# Patient Record
Sex: FEMALE | Race: BLACK OR AFRICAN AMERICAN | NOT HISPANIC OR LATINO | Employment: UNEMPLOYED | URBAN - METROPOLITAN AREA
[De-identification: names, ages, dates, MRNs, and addresses within clinical notes are randomized per-mention and may not be internally consistent; named-entity substitution may affect disease eponyms.]

---

## 2017-09-21 ENCOUNTER — HOSPITAL ENCOUNTER (EMERGENCY)
Facility: HOSPITAL | Age: 47
Discharge: HOME | End: 2017-09-21
Payer: COMMERCIAL

## 2017-09-21 VITALS
OXYGEN SATURATION: 98 % | TEMPERATURE: 99 F | HEART RATE: 93 BPM | SYSTOLIC BLOOD PRESSURE: 141 MMHG | WEIGHT: 272 LBS | RESPIRATION RATE: 18 BRPM | BODY MASS INDEX: 46.44 KG/M2 | DIASTOLIC BLOOD PRESSURE: 77 MMHG | HEIGHT: 64 IN

## 2017-09-21 DIAGNOSIS — N39.0 URINARY TRACT INFECTION WITH HEMATURIA, SITE UNSPECIFIED: Primary | ICD-10-CM

## 2017-09-21 DIAGNOSIS — R31.9 URINARY TRACT INFECTION WITH HEMATURIA, SITE UNSPECIFIED: Primary | ICD-10-CM

## 2017-09-21 LAB
B-HCG UR QL: NEGATIVE
BACTERIA #/AREA URNS AUTO: ABNORMAL /HPF
BILIRUB UR QL STRIP: NEGATIVE
CLARITY UR REFRACT.AUTO: ABNORMAL
COLOR UR AUTO: YELLOW
GLUCOSE UR QL STRIP: NEGATIVE
HGB UR QL STRIP: ABNORMAL
HYALINE CASTS UR QL AUTO: 0 /LPF
KETONES UR QL STRIP: NEGATIVE
LEUKOCYTE ESTERASE UR QL STRIP: ABNORMAL
MICROSCOPIC COMMENT: ABNORMAL
NITRITE UR QL STRIP: POSITIVE
PH UR STRIP: 5 [PH] (ref 5–8)
PROT UR QL STRIP: ABNORMAL
RBC #/AREA URNS AUTO: 4 /HPF (ref 0–4)
SP GR UR STRIP: 1.01 (ref 1–1.03)
URN SPEC COLLECT METH UR: ABNORMAL
UROBILINOGEN UR STRIP-ACNC: NEGATIVE EU/DL
WBC #/AREA URNS AUTO: >100 /HPF (ref 0–5)
WBC CLUMPS UR QL AUTO: ABNORMAL

## 2017-09-21 PROCEDURE — 99283 EMERGENCY DEPT VISIT LOW MDM: CPT

## 2017-09-21 PROCEDURE — 87077 CULTURE AEROBIC IDENTIFY: CPT

## 2017-09-21 PROCEDURE — 87186 SC STD MICRODIL/AGAR DIL: CPT

## 2017-09-21 PROCEDURE — 87088 URINE BACTERIA CULTURE: CPT

## 2017-09-21 PROCEDURE — 81000 URINALYSIS NONAUTO W/SCOPE: CPT

## 2017-09-21 PROCEDURE — 81025 URINE PREGNANCY TEST: CPT

## 2017-09-21 PROCEDURE — 87086 URINE CULTURE/COLONY COUNT: CPT

## 2017-09-21 RX ORDER — INSULIN GLARGINE 100 [IU]/ML
INJECTION, SOLUTION SUBCUTANEOUS NIGHTLY
COMMUNITY

## 2017-09-21 RX ORDER — SULFAMETHOXAZOLE AND TRIMETHOPRIM 800; 160 MG/1; MG/1
1 TABLET ORAL 2 TIMES DAILY
Qty: 14 TABLET | Refills: 0 | Status: SHIPPED | OUTPATIENT
Start: 2017-09-21 | End: 2017-09-28

## 2017-09-22 NOTE — ED PROVIDER NOTES
Encounter Date: 2017       History     Chief Complaint   Patient presents with    Urinary Tract Infection     Pt states she feels like she has a UTI. Pt states she's been having frequent urination and vaginal itching.     47-year-old female presents to the emergency room with urinary tract infection symptoms for approximately 2 days.  Patient has not taken anything for symptoms.  Denies any fever.  Reports pain and pressure urination.  Reports occasional vaginal itching.  Denies any vaginal discharge.  Reports strong odor and dark urine.  Reports suprapubic pain.          Review of patient's allergies indicates:   Allergen Reactions    Codeine      Past Medical History:   Diagnosis Date    Blood clot associated with vein wall inflammation     Diabetes mellitus     High cholesterol     Hypertension     Stroke      Past Surgical History:   Procedure Laterality Date    APPENDECTOMY      CARDIAC CATHETERIZATION      CARDIAC DEFIBRILLATOR PLACEMENT       SECTION       History reviewed. No pertinent family history.  Social History   Substance Use Topics    Smoking status: Never Smoker    Smokeless tobacco: Never Used    Alcohol use No     Review of Systems   Constitutional: Negative for fever.   HENT: Negative for sore throat.    Respiratory: Negative for shortness of breath.    Cardiovascular: Negative for chest pain.   Gastrointestinal: Negative for nausea.   Genitourinary: Positive for difficulty urinating, dysuria, frequency and urgency. Negative for flank pain, vaginal bleeding, vaginal discharge and vaginal pain.   Musculoskeletal: Negative for back pain.   Skin: Negative for rash.   Neurological: Negative for weakness.   Hematological: Does not bruise/bleed easily.   All other systems reviewed and are negative.      Physical Exam     Initial Vitals [174]   BP Pulse Resp Temp SpO2   (!) 141/77 93 18 98.6 °F (37 °C) 98 %      MAP       98.33         Physical Exam    Nursing note  and vitals reviewed.  Constitutional: She appears well-developed and well-nourished. No distress.   HENT:   Head: Normocephalic.   Eyes: Conjunctivae are normal.   Neck: Normal range of motion. Neck supple.   Cardiovascular: Normal rate.   Pulmonary/Chest: Breath sounds normal. No respiratory distress.   Abdominal: Soft. Bowel sounds are normal. She exhibits no distension and no abdominal bruit. There is tenderness in the suprapubic area. There is no rebound and no guarding. No hernia.   Neurological: She is alert and oriented to person, place, and time.   Skin: Skin is warm and dry.   Psychiatric: She has a normal mood and affect. Her behavior is normal. Judgment and thought content normal.         ED Course   Procedures  Labs Reviewed   URINALYSIS - Abnormal; Notable for the following:        Result Value    Appearance, UA Cloudy (*)     Protein, UA 1+ (*)     Occult Blood UA 3+ (*)     Nitrite, UA Positive (*)     Leukocytes, UA 3+ (*)     All other components within normal limits   URINALYSIS MICROSCOPIC - Abnormal; Notable for the following:     WBC, UA >100 (*)     WBC Clumps, UA Few (*)     Bacteria, UA Many (*)     All other components within normal limits   PREGNANCY TEST, URINE RAPID             Medical Decision Making:   Initial Assessment:   47-year-old female presents to the emergency room with urinary tract infection symptoms for approximately 2 days.  Patient has not taken anything for symptoms.  Denies any fever.  Reports pain and pressure urination.  Reports occasional vaginal itching.  Denies any vaginal discharge.  Reports strong odor and dark urine.  Reports suprapubic pain.  Abdomen is obese soft.  Minimal suprapubic tenderness.  Vital signs stable.  Differential Diagnosis:   UTI, vaginitis, cystitis  Clinical Tests:   Lab Tests: Ordered and Reviewed  ED Management:  Urine is positive for urinary tract infection.  I will prescribe an antibiotic.  I will also send the urine for culture.   Instructed patient to hydrate well.  Take Tylenol or Motrin as needed for fever.  If any symptoms worsen return to emergency department.                     ED Course      Clinical Impression:   The encounter diagnosis was Urinary tract infection with hematuria, site unspecified.    Disposition:   Disposition: Discharged  Condition: Stable                        Rosalba Auguste NP  09/21/17 7052

## 2017-09-25 LAB — BACTERIA UR CULT: NORMAL

## 2018-03-27 ENCOUNTER — LAB VISIT (OUTPATIENT)
Dept: LAB | Facility: HOSPITAL | Age: 48
End: 2018-03-27
Attending: INTERNAL MEDICINE
Payer: COMMERCIAL

## 2018-03-27 DIAGNOSIS — Z86.73 PERSONAL HISTORY OF TIA (TRANSIENT ISCHEMIC ATTACK): Primary | ICD-10-CM

## 2018-03-27 LAB
INR PPP: 3
PROTHROMBIN TIME: 31.8 SEC

## 2018-03-27 PROCEDURE — 85610 PROTHROMBIN TIME: CPT | Mod: PO

## 2018-03-27 PROCEDURE — 36415 COLL VENOUS BLD VENIPUNCTURE: CPT | Mod: PO

## 2018-04-12 ENCOUNTER — LAB VISIT (OUTPATIENT)
Dept: LAB | Facility: HOSPITAL | Age: 48
End: 2018-04-12
Attending: INTERNAL MEDICINE
Payer: COMMERCIAL

## 2018-04-12 DIAGNOSIS — Z79.01 LONG TERM (CURRENT) USE OF ANTICOAGULANTS: Primary | ICD-10-CM

## 2018-04-12 LAB
INR PPP: 4.7
PROTHROMBIN TIME: 50.2 SEC

## 2018-04-12 PROCEDURE — 36415 COLL VENOUS BLD VENIPUNCTURE: CPT | Mod: PO

## 2018-04-12 PROCEDURE — 85610 PROTHROMBIN TIME: CPT | Mod: PO

## 2018-04-17 ENCOUNTER — LAB VISIT (OUTPATIENT)
Dept: LAB | Facility: HOSPITAL | Age: 48
End: 2018-04-17
Attending: INTERNAL MEDICINE
Payer: COMMERCIAL

## 2018-04-17 DIAGNOSIS — Z79.01 LONG TERM (CURRENT) USE OF ANTICOAGULANTS: Primary | ICD-10-CM

## 2018-04-17 LAB
INR PPP: 2.4
PROTHROMBIN TIME: 25.6 SEC

## 2018-04-17 PROCEDURE — 85610 PROTHROMBIN TIME: CPT | Mod: PO

## 2018-04-17 PROCEDURE — 36415 COLL VENOUS BLD VENIPUNCTURE: CPT | Mod: PO

## 2018-04-30 ENCOUNTER — LAB VISIT (OUTPATIENT)
Dept: LAB | Facility: HOSPITAL | Age: 48
End: 2018-04-30
Attending: INTERNAL MEDICINE
Payer: COMMERCIAL

## 2018-04-30 DIAGNOSIS — Z79.01 LONG TERM (CURRENT) USE OF ANTICOAGULANTS: Primary | ICD-10-CM

## 2018-04-30 LAB
INR PPP: 3.9
PROTHROMBIN TIME: 42 SEC

## 2018-04-30 PROCEDURE — 85610 PROTHROMBIN TIME: CPT | Mod: PO

## 2018-04-30 PROCEDURE — 36415 COLL VENOUS BLD VENIPUNCTURE: CPT | Mod: PO

## 2018-05-09 ENCOUNTER — LAB VISIT (OUTPATIENT)
Dept: LAB | Facility: HOSPITAL | Age: 48
End: 2018-05-09
Attending: INTERNAL MEDICINE
Payer: COMMERCIAL

## 2018-05-09 DIAGNOSIS — Z79.01 LONG TERM (CURRENT) USE OF ANTICOAGULANTS: Primary | ICD-10-CM

## 2018-05-09 LAB
INR PPP: 2
PROTHROMBIN TIME: 22.1 SEC

## 2018-05-09 PROCEDURE — 85610 PROTHROMBIN TIME: CPT | Mod: PO

## 2018-05-09 PROCEDURE — 36415 COLL VENOUS BLD VENIPUNCTURE: CPT | Mod: PO

## 2018-06-13 ENCOUNTER — LAB VISIT (OUTPATIENT)
Dept: LAB | Facility: HOSPITAL | Age: 48
End: 2018-06-13
Attending: INTERNAL MEDICINE
Payer: COMMERCIAL

## 2018-06-13 DIAGNOSIS — Z79.01 LONG TERM (CURRENT) USE OF ANTICOAGULANTS: Primary | ICD-10-CM

## 2018-06-13 LAB
INR PPP: 3.5
PROTHROMBIN TIME: 37.3 SEC

## 2018-06-13 PROCEDURE — 36415 COLL VENOUS BLD VENIPUNCTURE: CPT | Mod: PO

## 2018-06-13 PROCEDURE — 85610 PROTHROMBIN TIME: CPT | Mod: PO

## 2018-07-26 ENCOUNTER — LAB VISIT (OUTPATIENT)
Dept: LAB | Facility: HOSPITAL | Age: 48
End: 2018-07-26
Attending: INTERNAL MEDICINE
Payer: COMMERCIAL

## 2018-07-26 DIAGNOSIS — Z86.73 PERSONAL HISTORY OF TIA (TRANSIENT ISCHEMIC ATTACK): Primary | ICD-10-CM

## 2018-07-26 DIAGNOSIS — I42.9 CARDIOMYOPATHY: ICD-10-CM

## 2018-07-26 DIAGNOSIS — Z79.01 LONG TERM (CURRENT) USE OF ANTICOAGULANTS: ICD-10-CM

## 2018-07-26 LAB
INR PPP: 2.9
PROTHROMBIN TIME: 30.8 SEC

## 2018-07-26 PROCEDURE — 36415 COLL VENOUS BLD VENIPUNCTURE: CPT | Mod: PO

## 2018-07-26 PROCEDURE — 85610 PROTHROMBIN TIME: CPT | Mod: PO

## 2018-09-05 ENCOUNTER — LAB VISIT (OUTPATIENT)
Dept: LAB | Facility: HOSPITAL | Age: 48
End: 2018-09-05
Attending: INTERNAL MEDICINE
Payer: COMMERCIAL

## 2018-09-05 DIAGNOSIS — Z86.73 PERSONAL HISTORY OF TIA (TRANSIENT ISCHEMIC ATTACK): ICD-10-CM

## 2018-09-05 DIAGNOSIS — I42.9 CARDIOMYOPATHY: Primary | ICD-10-CM

## 2018-09-05 DIAGNOSIS — Z79.01 LONG TERM CURRENT USE OF ANTICOAGULANT THERAPY: ICD-10-CM

## 2018-09-05 LAB
INR PPP: 3.4
PROTHROMBIN TIME: 36.7 SEC

## 2018-09-05 PROCEDURE — 85610 PROTHROMBIN TIME: CPT | Mod: PO

## 2018-09-05 PROCEDURE — 36415 COLL VENOUS BLD VENIPUNCTURE: CPT | Mod: PO

## 2018-09-24 ENCOUNTER — LAB VISIT (OUTPATIENT)
Dept: LAB | Facility: HOSPITAL | Age: 48
End: 2018-09-24
Attending: INTERNAL MEDICINE
Payer: COMMERCIAL

## 2018-09-24 DIAGNOSIS — Z79.01 LONG TERM (CURRENT) USE OF ANTICOAGULANTS: Primary | ICD-10-CM

## 2018-09-24 LAB
INR PPP: 2.1
PROTHROMBIN TIME: 23 SEC

## 2018-09-24 PROCEDURE — 36415 COLL VENOUS BLD VENIPUNCTURE: CPT | Mod: PO

## 2018-09-24 PROCEDURE — 85610 PROTHROMBIN TIME: CPT | Mod: PO

## 2018-10-23 ENCOUNTER — LAB VISIT (OUTPATIENT)
Dept: LAB | Facility: HOSPITAL | Age: 48
End: 2018-10-23
Attending: INTERNAL MEDICINE
Payer: COMMERCIAL

## 2018-10-23 DIAGNOSIS — Z86.73 PERSONAL HISTORY OF TIA (TRANSIENT ISCHEMIC ATTACK): Primary | ICD-10-CM

## 2018-10-23 LAB
INR PPP: 2.4
PROTHROMBIN TIME: 25.3 SEC

## 2018-10-23 PROCEDURE — 36415 COLL VENOUS BLD VENIPUNCTURE: CPT | Mod: PO

## 2018-10-23 PROCEDURE — 85610 PROTHROMBIN TIME: CPT | Mod: PO

## 2018-11-20 ENCOUNTER — LAB VISIT (OUTPATIENT)
Dept: LAB | Facility: HOSPITAL | Age: 48
End: 2018-11-20
Attending: INTERNAL MEDICINE
Payer: COMMERCIAL

## 2018-11-20 DIAGNOSIS — Z79.01 LONG TERM (CURRENT) USE OF ANTICOAGULANTS: Primary | ICD-10-CM

## 2018-11-20 LAB
INR PPP: 1.6
PROTHROMBIN TIME: 16.9 SEC

## 2018-11-20 PROCEDURE — 85610 PROTHROMBIN TIME: CPT | Mod: PO

## 2018-11-20 PROCEDURE — 36415 COLL VENOUS BLD VENIPUNCTURE: CPT | Mod: PO

## 2018-11-27 ENCOUNTER — LAB VISIT (OUTPATIENT)
Dept: LAB | Facility: HOSPITAL | Age: 48
End: 2018-11-27
Attending: INTERNAL MEDICINE
Payer: COMMERCIAL

## 2018-11-27 DIAGNOSIS — Z79.01 LONG TERM (CURRENT) USE OF ANTICOAGULANTS: Primary | ICD-10-CM

## 2018-11-27 LAB
INR PPP: 1.5
INR PPP: 1.5
PROTHROMBIN TIME: 16.6 SEC
PROTHROMBIN TIME: 16.6 SEC

## 2018-11-27 PROCEDURE — 85610 PROTHROMBIN TIME: CPT | Mod: PO

## 2018-11-27 PROCEDURE — 36415 COLL VENOUS BLD VENIPUNCTURE: CPT | Mod: PO

## 2018-12-07 ENCOUNTER — LAB VISIT (OUTPATIENT)
Dept: LAB | Facility: HOSPITAL | Age: 48
End: 2018-12-07
Attending: INTERNAL MEDICINE
Payer: COMMERCIAL

## 2018-12-07 DIAGNOSIS — Z79.01 LONG TERM (CURRENT) USE OF ANTICOAGULANTS: Primary | ICD-10-CM

## 2018-12-07 LAB
INR PPP: 1.9
PROTHROMBIN TIME: 20.3 SEC

## 2018-12-07 PROCEDURE — 36415 COLL VENOUS BLD VENIPUNCTURE: CPT | Mod: PO

## 2018-12-07 PROCEDURE — 85610 PROTHROMBIN TIME: CPT | Mod: PO

## 2019-01-22 ENCOUNTER — LAB VISIT (OUTPATIENT)
Dept: LAB | Facility: HOSPITAL | Age: 49
End: 2019-01-22
Attending: INTERNAL MEDICINE
Payer: COMMERCIAL

## 2019-01-22 DIAGNOSIS — Z79.01 LONG TERM (CURRENT) USE OF ANTICOAGULANTS: Primary | ICD-10-CM

## 2019-01-22 LAB
INR PPP: 2.9
PROTHROMBIN TIME: 30.6 SEC

## 2019-01-22 PROCEDURE — 85610 PROTHROMBIN TIME: CPT | Mod: PO,ER

## 2019-01-22 PROCEDURE — 36415 COLL VENOUS BLD VENIPUNCTURE: CPT | Mod: PO,ER

## 2019-02-26 ENCOUNTER — LAB VISIT (OUTPATIENT)
Dept: LAB | Facility: HOSPITAL | Age: 49
End: 2019-02-26
Attending: INTERNAL MEDICINE
Payer: COMMERCIAL

## 2019-02-26 DIAGNOSIS — Z79.01 LONG TERM (CURRENT) USE OF ANTICOAGULANTS: Primary | ICD-10-CM

## 2019-02-26 LAB
INR PPP: 4
PROTHROMBIN TIME: 43.1 SEC

## 2019-02-26 PROCEDURE — 85610 PROTHROMBIN TIME: CPT | Mod: PO

## 2019-02-26 PROCEDURE — 36415 COLL VENOUS BLD VENIPUNCTURE: CPT | Mod: PO

## 2019-03-07 ENCOUNTER — LAB VISIT (OUTPATIENT)
Dept: LAB | Facility: HOSPITAL | Age: 49
End: 2019-03-07
Attending: INTERNAL MEDICINE
Payer: COMMERCIAL

## 2019-03-07 DIAGNOSIS — Z79.01 LONG TERM (CURRENT) USE OF ANTICOAGULANTS: Primary | ICD-10-CM

## 2019-03-07 LAB
INR PPP: 1.4
PROTHROMBIN TIME: 15.6 SEC

## 2019-03-07 PROCEDURE — 85610 PROTHROMBIN TIME: CPT | Mod: PO

## 2019-03-07 PROCEDURE — 36415 COLL VENOUS BLD VENIPUNCTURE: CPT | Mod: PO

## 2019-03-26 ENCOUNTER — LAB VISIT (OUTPATIENT)
Dept: LAB | Facility: HOSPITAL | Age: 49
End: 2019-03-26
Attending: INTERNAL MEDICINE
Payer: COMMERCIAL

## 2019-03-26 DIAGNOSIS — Z79.01 LONG TERM (CURRENT) USE OF ANTICOAGULANTS: Primary | ICD-10-CM

## 2019-03-26 LAB
INR PPP: 1.8 (ref 0.8–1.2)
PROTHROMBIN TIME: 19.1 SEC (ref 9–12.5)

## 2019-03-26 PROCEDURE — 85610 PROTHROMBIN TIME: CPT | Mod: PO

## 2019-03-26 PROCEDURE — 36415 COLL VENOUS BLD VENIPUNCTURE: CPT | Mod: PO

## 2019-04-04 ENCOUNTER — HOSPITAL ENCOUNTER (EMERGENCY)
Facility: HOSPITAL | Age: 49
Discharge: HOME | End: 2019-04-04
Attending: SURGERY
Payer: COMMERCIAL

## 2019-04-04 VITALS
OXYGEN SATURATION: 100 % | WEIGHT: 293 LBS | SYSTOLIC BLOOD PRESSURE: 124 MMHG | BODY MASS INDEX: 51.49 KG/M2 | TEMPERATURE: 98 F | HEART RATE: 74 BPM | RESPIRATION RATE: 14 BRPM | DIASTOLIC BLOOD PRESSURE: 80 MMHG

## 2019-04-04 DIAGNOSIS — R07.9 CHEST PAIN: ICD-10-CM

## 2019-04-04 DIAGNOSIS — I42.8 NONISCHEMIC CARDIOMYOPATHY: ICD-10-CM

## 2019-04-04 DIAGNOSIS — I50.41 ACUTE COMBINED SYSTOLIC AND DIASTOLIC CONGESTIVE HEART FAILURE: Primary | ICD-10-CM

## 2019-04-04 LAB
ALBUMIN SERPL BCP-MCNC: 3.9 G/DL (ref 3.5–5.2)
ALP SERPL-CCNC: 62 U/L (ref 38–126)
ALT SERPL W/O P-5'-P-CCNC: 20 U/L (ref 10–44)
ANION GAP SERPL CALC-SCNC: 7 MMOL/L (ref 8–16)
AST SERPL-CCNC: 20 U/L (ref 15–46)
B-HCG UR QL: NEGATIVE
BASOPHILS # BLD AUTO: 0.02 K/UL (ref 0–0.2)
BASOPHILS NFR BLD: 0.3 % (ref 0–1.9)
BILIRUB SERPL-MCNC: 0.2 MG/DL (ref 0.1–1)
BILIRUB UR QL STRIP: NEGATIVE
BUN SERPL-MCNC: 19 MG/DL (ref 7–17)
CALCIUM SERPL-MCNC: 9.2 MG/DL (ref 8.7–10.5)
CHLORIDE SERPL-SCNC: 108 MMOL/L (ref 95–110)
CLARITY UR REFRACT.AUTO: CLEAR
CO2 SERPL-SCNC: 24 MMOL/L (ref 23–29)
COLOR UR AUTO: YELLOW
CREAT SERPL-MCNC: 1.09 MG/DL (ref 0.5–1.4)
D DIMER PPP FEU-MCNC: <200 NG/ML
DIFFERENTIAL METHOD: ABNORMAL
EOSINOPHIL # BLD AUTO: 0.2 K/UL (ref 0–0.5)
EOSINOPHIL NFR BLD: 3 % (ref 0–8)
ERYTHROCYTE [DISTWIDTH] IN BLOOD BY AUTOMATED COUNT: 14.5 % (ref 11.5–14.5)
EST. GFR  (AFRICAN AMERICAN): >60 ML/MIN/1.73 M^2
EST. GFR  (NON AFRICAN AMERICAN): 59.8 ML/MIN/1.73 M^2
GLUCOSE SERPL-MCNC: 170 MG/DL (ref 70–110)
GLUCOSE UR QL STRIP: NEGATIVE
HCT VFR BLD AUTO: 33.6 % (ref 37–48.5)
HGB BLD-MCNC: 10.5 G/DL (ref 12–16)
HGB UR QL STRIP: ABNORMAL
INR PPP: 2.7 (ref 0.8–1.2)
KETONES UR QL STRIP: NEGATIVE
LEUKOCYTE ESTERASE UR QL STRIP: ABNORMAL
LYMPHOCYTES # BLD AUTO: 2.5 K/UL (ref 1–4.8)
LYMPHOCYTES NFR BLD: 31.7 % (ref 18–48)
MCH RBC QN AUTO: 27.5 PG (ref 27–31)
MCHC RBC AUTO-ENTMCNC: 31.3 G/DL (ref 32–36)
MCV RBC AUTO: 88 FL (ref 82–98)
MICROSCOPIC COMMENT: NORMAL
MONOCYTES # BLD AUTO: 0.7 K/UL (ref 0.3–1)
MONOCYTES NFR BLD: 9.3 % (ref 4–15)
NEUTROPHILS # BLD AUTO: 4.4 K/UL (ref 1.8–7.7)
NEUTROPHILS NFR BLD: 55.2 % (ref 38–73)
NITRITE UR QL STRIP: NEGATIVE
NT-PROBNP: 484 PG/ML (ref 5–450)
PH UR STRIP: 5 [PH] (ref 5–8)
PLATELET # BLD AUTO: 253 K/UL (ref 150–350)
PMV BLD AUTO: 10.2 FL (ref 9.2–12.9)
POTASSIUM SERPL-SCNC: 4.7 MMOL/L (ref 3.5–5.1)
PROT SERPL-MCNC: 7.1 G/DL (ref 6–8.4)
PROT UR QL STRIP: NEGATIVE
PROTHROMBIN TIME: 28.5 SEC (ref 9–12.5)
RBC # BLD AUTO: 3.82 M/UL (ref 4–5.4)
RBC #/AREA URNS AUTO: 4 /HPF (ref 0–4)
SODIUM SERPL-SCNC: 139 MMOL/L (ref 136–145)
SP GR UR STRIP: 1.01 (ref 1–1.03)
TROPONIN I SERPL DL<=0.01 NG/ML-MCNC: <0.012 NG/ML (ref 0.01–0.03)
TROPONIN I SERPL DL<=0.01 NG/ML-MCNC: <0.012 NG/ML (ref 0.01–0.03)
URN SPEC COLLECT METH UR: ABNORMAL
UROBILINOGEN UR STRIP-ACNC: NEGATIVE EU/DL
WBC # BLD AUTO: 7.99 K/UL (ref 3.9–12.7)
WBC #/AREA URNS AUTO: 2 /HPF (ref 0–5)

## 2019-04-04 PROCEDURE — 93010 EKG 12-LEAD: ICD-10-PCS | Mod: ,,, | Performed by: STUDENT IN AN ORGANIZED HEALTH CARE EDUCATION/TRAINING PROGRAM

## 2019-04-04 PROCEDURE — 96361 HYDRATE IV INFUSION ADD-ON: CPT | Mod: ER

## 2019-04-04 PROCEDURE — 80053 COMPREHEN METABOLIC PANEL: CPT | Mod: ER

## 2019-04-04 PROCEDURE — 93005 ELECTROCARDIOGRAM TRACING: CPT | Mod: ER

## 2019-04-04 PROCEDURE — 81025 URINE PREGNANCY TEST: CPT | Mod: ER

## 2019-04-04 PROCEDURE — 85379 FIBRIN DEGRADATION QUANT: CPT | Mod: ER

## 2019-04-04 PROCEDURE — 25000003 PHARM REV CODE 250: Mod: ER | Performed by: SURGERY

## 2019-04-04 PROCEDURE — 84484 ASSAY OF TROPONIN QUANT: CPT | Mod: 91,ER

## 2019-04-04 PROCEDURE — 63600175 PHARM REV CODE 636 W HCPCS: Mod: ER | Performed by: SURGERY

## 2019-04-04 PROCEDURE — 81000 URINALYSIS NONAUTO W/SCOPE: CPT | Mod: ER

## 2019-04-04 PROCEDURE — 83880 ASSAY OF NATRIURETIC PEPTIDE: CPT | Mod: ER

## 2019-04-04 PROCEDURE — 85610 PROTHROMBIN TIME: CPT | Mod: ER

## 2019-04-04 PROCEDURE — 99283 EMERGENCY DEPT VISIT LOW MDM: CPT | Mod: 25,ER

## 2019-04-04 PROCEDURE — 96375 TX/PRO/DX INJ NEW DRUG ADDON: CPT | Mod: ER

## 2019-04-04 PROCEDURE — 93010 ELECTROCARDIOGRAM REPORT: CPT | Mod: ,,, | Performed by: STUDENT IN AN ORGANIZED HEALTH CARE EDUCATION/TRAINING PROGRAM

## 2019-04-04 PROCEDURE — 96374 THER/PROPH/DIAG INJ IV PUSH: CPT | Mod: ER

## 2019-04-04 PROCEDURE — 85025 COMPLETE CBC W/AUTO DIFF WBC: CPT | Mod: ER

## 2019-04-04 RX ORDER — SODIUM CHLORIDE 9 MG/ML
1000 INJECTION, SOLUTION INTRAVENOUS
Status: COMPLETED | OUTPATIENT
Start: 2019-04-04 | End: 2019-04-04

## 2019-04-04 RX ORDER — ASPIRIN 325 MG
325 TABLET ORAL
Status: COMPLETED | OUTPATIENT
Start: 2019-04-04 | End: 2019-04-04

## 2019-04-04 RX ORDER — FUROSEMIDE 10 MG/ML
20 INJECTION INTRAMUSCULAR; INTRAVENOUS
Status: COMPLETED | OUTPATIENT
Start: 2019-04-04 | End: 2019-04-04

## 2019-04-04 RX ORDER — LORAZEPAM 0.5 MG/1
0.5 TABLET ORAL
Status: COMPLETED | OUTPATIENT
Start: 2019-04-04 | End: 2019-04-04

## 2019-04-04 RX ORDER — ONDANSETRON 2 MG/ML
4 INJECTION INTRAMUSCULAR; INTRAVENOUS
Status: COMPLETED | OUTPATIENT
Start: 2019-04-04 | End: 2019-04-04

## 2019-04-04 RX ORDER — LORAZEPAM 1 MG/1
1 TABLET ORAL 2 TIMES DAILY
Qty: 10 TABLET | Refills: 0 | Status: ON HOLD | OUTPATIENT
Start: 2019-04-04 | End: 2023-02-10 | Stop reason: HOSPADM

## 2019-04-04 RX ADMIN — ONDANSETRON 4 MG: 2 INJECTION INTRAMUSCULAR; INTRAVENOUS at 08:04

## 2019-04-04 RX ADMIN — LIDOCAINE HYDROCHLORIDE: 20 SOLUTION ORAL; TOPICAL at 08:04

## 2019-04-04 RX ADMIN — FUROSEMIDE 20 MG: 10 INJECTION, SOLUTION INTRAMUSCULAR; INTRAVENOUS at 09:04

## 2019-04-04 RX ADMIN — SODIUM CHLORIDE 1000 ML: 0.9 INJECTION, SOLUTION INTRAVENOUS at 08:04

## 2019-04-04 RX ADMIN — ASPIRIN 325 MG ORAL TABLET 325 MG: 325 PILL ORAL at 08:04

## 2019-04-04 RX ADMIN — LORAZEPAM 0.5 MG: 0.5 TABLET ORAL at 09:04

## 2019-04-04 NOTE — ED PROVIDER NOTES
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  She has had associated shortness of breath.  She feels better now but still has some residual pain and shortness of breath. She has a history of nonischemic cardiomyopathy hypertension diabetes and DVT she is on anticoagulation    The history is provided by the patient.   Chest Pain   The current episode started 1 to 2 hours ago. Duration of episode(s) is 90 minutes. Chest pain occurs intermittently. The chest pain is improving. The pain is associated with breathing and coughing. At its most intense, the chest pain is at 8/10. The chest pain is currently at 7/10. The quality of the pain is described as aching. The pain does not radiate. Chest pain is worsened by deep breathing. Primary symptoms include shortness of breath. Pertinent negatives for primary symptoms include no cough, no wheezing, no palpitations, no abdominal pain and no dizziness.     Review of patient's allergies indicates:   Allergen Reactions    Codeine      Past Medical History:   Diagnosis Date    Blood clot associated with vein wall inflammation     Diabetes mellitus     High cholesterol     Hypertension     Stroke      Past Surgical History:   Procedure Laterality Date    APPENDECTOMY      CARDIAC CATHETERIZATION      CARDIAC DEFIBRILLATOR PLACEMENT       SECTION       History reviewed. No pertinent family history.  Social History     Tobacco Use    Smoking status: Never Smoker    Smokeless tobacco: Never Used   Substance Use Topics    Alcohol use: No    Drug use: No     Review of Systems   Constitutional: Negative.    HENT: Negative.    Eyes: Negative.    Respiratory: Positive for shortness of  breath. Negative for cough and wheezing.    Cardiovascular: Positive for chest pain. Negative for palpitations.   Gastrointestinal: Negative for abdominal pain.   Endocrine: Negative.    Genitourinary: Negative.    Musculoskeletal: Negative.    Skin: Negative.    Allergic/Immunologic: Negative.    Neurological: Negative.  Negative for dizziness.   Hematological: Negative.    Psychiatric/Behavioral: Negative.        Physical Exam     Initial Vitals [04/04/19 0749]   BP Pulse Resp Temp SpO2   125/73 76 15 98.5 °F (36.9 °C) 100 %      MAP       --         Physical Exam    Nursing note and vitals reviewed.  Constitutional: She appears well-developed and well-nourished.   Cardiovascular: Normal rate, regular rhythm, normal heart sounds and intact distal pulses.         ED Course   Procedures  Labs Reviewed   CBC W/ AUTO DIFFERENTIAL - Abnormal; Notable for the following components:       Result Value    RBC 3.82 (*)     Hemoglobin 10.5 (*)     Hematocrit 33.6 (*)     MCHC 31.3 (*)     All other components within normal limits   COMPREHENSIVE METABOLIC PANEL - Abnormal; Notable for the following components:    Glucose 170 (*)     BUN, Bld 19 (*)     Anion Gap 7 (*)     eGFR if non  59.8 (*)     All other components within normal limits   PROTIME-INR - Abnormal; Notable for the following components:    Prothrombin Time 28.5 (*)     INR 2.7 (*)     All other components within normal limits   URINALYSIS, REFLEX TO URINE CULTURE - Abnormal; Notable for the following components:    Occult Blood UA 1+ (*)     Leukocytes, UA 1+ (*)     All other components within normal limits    Narrative:     Preferred Collection Type->Urine, Clean Catch   NT-PRO NATRIURETIC PEPTIDE - Abnormal; Notable for the following components:    NT-proBNP 484 (*)     All other components within normal limits   TROPONIN I   PREGNANCY TEST, URINE RAPID   D DIMER   TROPONIN I   URINALYSIS MICROSCOPIC    Narrative:     Preferred Collection  Type->Urine, Clean Catch     EKG Readings: (Independently Interpreted)   Initial Reading: No STEMI. Rhythm: Normal Sinus Rhythm. Clinical Impression: Non-specific ST Depression     ECG Results          EKG 12-lead (In process)  Result time 04/04/19 11:12:08    In process by Interface, Lab In [facility] (04/04/19 11:12:08)                 Narrative:    Test Reason : R07.9,    Vent. Rate : 083 BPM     Atrial Rate : 083 BPM     P-R Int : 134 ms          QRS Dur : 080 ms      QT Int : 398 ms       P-R-T Axes : 038 038 -05 degrees     QTc Int : 467 ms    Normal sinus rhythm  Low voltage QRS  Nonspecific ST and T wave abnormality  Abnormal ECG  When compared with ECG of 17-JUL-2015 15:38,  Nonspecific T wave abnormality, improved in Lateral leads    Referred By: [email or web address]   SELF           Confirmed By:                             Imaging Results          X-Ray Chest AP Portable (Final result)  Result time 04/04/19 08:20:53    Final result by Michael Chiu MD (04/04/19 08:20:53)                 Impression:      Enlargement of the cardiac silhouette could reflect chamber enlargement or pericardial effusion or both.      Electronically signed by: Michael Chiu MD  Date:    04/04/2019  Time:    08:20             Narrative:    EXAMINATION:  XR CHEST AP PORTABLE    CLINICAL HISTORY:  chest pain;    FINDINGS:  Single view of the chest.  Left-sided pacing device noted.    Cardiac silhouette is enlarged.  The lungs demonstrate no evidence of active disease.  No evidence of pleural effusion or pneumothorax.  Bones appear intact.  Evaluation is limited by body habitus.                                 Medical Decision Making:   Initial Assessment:   Atypical chest pain with history of cardiomyopathy and mild congestive heart failure  ED Management:  Patient has mild CHF and no acute EKG changes and both troponins were negative. Chest x-ray is clear At the time of discharge she is chest pain-free and asymptomatic  she needs told to  follow up with her cardiologist at [facility] next week                      Clinical Impression:       ICD-10-CM ICD-9-CM   1. Acute combined systolic and diastolic congestive heart failure I50.41 428.41     428.0   2. Chest pain R07.9 786.50   3. Nonischemic cardiomyopathy I42.8 425.4         Disposition:   Disposition: Discharged  Condition: Stable                        KENYETTA Zamora III, MD  04/04/19 6088

## 2019-04-04 NOTE — ED NOTES
Wheeled to exam room with complaint of chest pain and SOB that started about 1hr pta. Denies any nausea, vomiting, or dizziness. Also reports feeling weak. Pt is morbidly obese. BBS CTA. No fever. Pain is non radiating.

## 2019-04-04 NOTE — ED NOTES
Pt unable to walk from lobby. PCT Katty went to get wheel chair when calling patient back to room. Pt moving very slowly and unable to get up. Pt sdtated she is sob.

## 2019-06-26 ENCOUNTER — LAB VISIT (OUTPATIENT)
Dept: LAB | Facility: HOSPITAL | Age: 49
End: 2019-06-26
Attending: INTERNAL MEDICINE
Payer: COMMERCIAL

## 2019-06-26 DIAGNOSIS — Z79.01 LONG TERM (CURRENT) USE OF ANTICOAGULANTS: Primary | ICD-10-CM

## 2019-06-26 LAB
INR PPP: 3.6 (ref 0.8–1.2)
PROTHROMBIN TIME: 38.1 SEC (ref 9–12.5)

## 2019-06-26 PROCEDURE — 85610 PROTHROMBIN TIME: CPT | Mod: PO

## 2019-06-26 PROCEDURE — 36415 COLL VENOUS BLD VENIPUNCTURE: CPT | Mod: PO

## 2019-07-09 ENCOUNTER — LAB VISIT (OUTPATIENT)
Dept: LAB | Facility: HOSPITAL | Age: 49
End: 2019-07-09
Attending: INTERNAL MEDICINE
Payer: COMMERCIAL

## 2019-07-09 DIAGNOSIS — Z79.01 LONG TERM (CURRENT) USE OF ANTICOAGULANTS: Primary | ICD-10-CM

## 2019-07-09 LAB
INR PPP: 3.2 (ref 0.8–1.2)
PROTHROMBIN TIME: 34.7 SEC (ref 9–12.5)

## 2019-07-09 PROCEDURE — 85610 PROTHROMBIN TIME: CPT | Mod: PO

## 2019-07-09 PROCEDURE — 36415 COLL VENOUS BLD VENIPUNCTURE: CPT | Mod: PO

## 2019-08-09 ENCOUNTER — LAB VISIT (OUTPATIENT)
Dept: LAB | Facility: HOSPITAL | Age: 49
End: 2019-08-09
Attending: INTERNAL MEDICINE
Payer: COMMERCIAL

## 2019-08-09 DIAGNOSIS — Z79.01 LONG TERM (CURRENT) USE OF ANTICOAGULANTS: Primary | ICD-10-CM

## 2019-08-09 LAB
INR PPP: 3.1 (ref 0.8–1.2)
PROTHROMBIN TIME: 33.2 SEC (ref 9–12.5)

## 2019-08-09 PROCEDURE — 36415 COLL VENOUS BLD VENIPUNCTURE: CPT | Mod: PO

## 2019-08-09 PROCEDURE — 85610 PROTHROMBIN TIME: CPT | Mod: PO

## 2019-09-26 ENCOUNTER — LAB VISIT (OUTPATIENT)
Dept: LAB | Facility: HOSPITAL | Age: 49
End: 2019-09-26
Attending: INTERNAL MEDICINE
Payer: COMMERCIAL

## 2019-09-26 DIAGNOSIS — Z79.01 LONG TERM (CURRENT) USE OF ANTICOAGULANTS: Primary | ICD-10-CM

## 2019-09-26 LAB
INR PPP: 1.8 (ref 0.8–1.2)
PROTHROMBIN TIME: 19.5 SEC (ref 9–12.5)

## 2019-09-26 PROCEDURE — 85610 PROTHROMBIN TIME: CPT | Mod: PO

## 2019-09-26 PROCEDURE — 36415 COLL VENOUS BLD VENIPUNCTURE: CPT | Mod: PO

## 2019-11-11 ENCOUNTER — HOSPITAL ENCOUNTER (EMERGENCY)
Facility: HOSPITAL | Age: 49
Discharge: HOME | End: 2019-11-11
Attending: FAMILY MEDICINE
Payer: COMMERCIAL

## 2019-11-11 VITALS
SYSTOLIC BLOOD PRESSURE: 158 MMHG | DIASTOLIC BLOOD PRESSURE: 74 MMHG | OXYGEN SATURATION: 100 % | WEIGHT: 291 LBS | RESPIRATION RATE: 16 BRPM | HEIGHT: 65 IN | BODY MASS INDEX: 48.48 KG/M2 | TEMPERATURE: 98 F | HEART RATE: 68 BPM

## 2019-11-11 DIAGNOSIS — R42 DIZZINESS: Primary | ICD-10-CM

## 2019-11-11 DIAGNOSIS — N30.00 ACUTE CYSTITIS WITHOUT HEMATURIA: ICD-10-CM

## 2019-11-11 LAB
ALBUMIN SERPL BCP-MCNC: 3.9 G/DL (ref 3.5–5.2)
ALP SERPL-CCNC: 55 U/L (ref 38–126)
ALT SERPL W/O P-5'-P-CCNC: 18 U/L (ref 10–44)
ANION GAP SERPL CALC-SCNC: 8 MMOL/L (ref 8–16)
APTT BLDCRRT: 32.5 SEC (ref 21–32)
AST SERPL-CCNC: 28 U/L (ref 15–46)
BACTERIA #/AREA URNS AUTO: ABNORMAL /HPF
BASOPHILS # BLD AUTO: 0.02 K/UL (ref 0–0.2)
BASOPHILS NFR BLD: 0.3 % (ref 0–1.9)
BILIRUB SERPL-MCNC: 0.3 MG/DL (ref 0.1–1)
BILIRUB UR QL STRIP: NEGATIVE
BUN SERPL-MCNC: 31 MG/DL (ref 7–17)
CALCIUM SERPL-MCNC: 9.5 MG/DL (ref 8.7–10.5)
CHLORIDE SERPL-SCNC: 108 MMOL/L (ref 95–110)
CLARITY UR REFRACT.AUTO: CLEAR
CO2 SERPL-SCNC: 23 MMOL/L (ref 23–29)
COLOR UR AUTO: YELLOW
CREAT SERPL-MCNC: 1.19 MG/DL (ref 0.5–1.4)
DIFFERENTIAL METHOD: ABNORMAL
EOSINOPHIL # BLD AUTO: 0.3 K/UL (ref 0–0.5)
EOSINOPHIL NFR BLD: 3.6 % (ref 0–8)
ERYTHROCYTE [DISTWIDTH] IN BLOOD BY AUTOMATED COUNT: 14.9 % (ref 11.5–14.5)
EST. GFR  (AFRICAN AMERICAN): >60 ML/MIN/1.73 M^2
EST. GFR  (NON AFRICAN AMERICAN): 53.4 ML/MIN/1.73 M^2
GLUCOSE SERPL-MCNC: 163 MG/DL (ref 70–110)
GLUCOSE UR QL STRIP: NEGATIVE
HCT VFR BLD AUTO: 33.8 % (ref 37–48.5)
HGB BLD-MCNC: 10.3 G/DL (ref 12–16)
HGB UR QL STRIP: ABNORMAL
HYALINE CASTS UR QL AUTO: 0 /LPF
INR PPP: 2.1 (ref 0.8–1.2)
KETONES UR QL STRIP: NEGATIVE
LEUKOCYTE ESTERASE UR QL STRIP: ABNORMAL
LYMPHOCYTES # BLD AUTO: 2.4 K/UL (ref 1–4.8)
LYMPHOCYTES NFR BLD: 30.8 % (ref 18–48)
MCH RBC QN AUTO: 27.6 PG (ref 27–31)
MCHC RBC AUTO-ENTMCNC: 30.5 G/DL (ref 32–36)
MCV RBC AUTO: 91 FL (ref 82–98)
MICROSCOPIC COMMENT: ABNORMAL
MONOCYTES # BLD AUTO: 0.7 K/UL (ref 0.3–1)
MONOCYTES NFR BLD: 9.2 % (ref 4–15)
NEUTROPHILS # BLD AUTO: 4.3 K/UL (ref 1.8–7.7)
NEUTROPHILS NFR BLD: 56.1 % (ref 38–73)
NITRITE UR QL STRIP: NEGATIVE
NT-PROBNP: 148 PG/ML (ref 5–450)
PH UR STRIP: 6 [PH] (ref 5–8)
PLATELET # BLD AUTO: 248 K/UL (ref 150–350)
PMV BLD AUTO: 10.5 FL (ref 9.2–12.9)
POCT GLUCOSE: 158 MG/DL (ref 70–110)
POTASSIUM SERPL-SCNC: 4.8 MMOL/L (ref 3.5–5.1)
PROT SERPL-MCNC: 7.2 G/DL (ref 6–8.4)
PROT UR QL STRIP: ABNORMAL
PROTHROMBIN TIME: 22.9 SEC (ref 9–12.5)
RBC # BLD AUTO: 3.73 M/UL (ref 4–5.4)
RBC #/AREA URNS AUTO: 2 /HPF (ref 0–4)
SODIUM SERPL-SCNC: 139 MMOL/L (ref 136–145)
SP GR UR STRIP: 1.02 (ref 1–1.03)
TROPONIN I SERPL DL<=0.01 NG/ML-MCNC: 0.01 NG/ML (ref 0.01–0.03)
URN SPEC COLLECT METH UR: ABNORMAL
UROBILINOGEN UR STRIP-ACNC: NEGATIVE EU/DL
WBC # BLD AUTO: 7.79 K/UL (ref 3.9–12.7)
WBC #/AREA URNS AUTO: 0 /HPF (ref 0–5)

## 2019-11-11 PROCEDURE — 93005 ELECTROCARDIOGRAM TRACING: CPT | Mod: ER

## 2019-11-11 PROCEDURE — 94760 N-INVAS EAR/PLS OXIMETRY 1: CPT | Mod: ER

## 2019-11-11 PROCEDURE — 84484 ASSAY OF TROPONIN QUANT: CPT | Mod: ER

## 2019-11-11 PROCEDURE — 36000 PLACE NEEDLE IN VEIN: CPT | Mod: ER

## 2019-11-11 PROCEDURE — 85610 PROTHROMBIN TIME: CPT | Mod: ER

## 2019-11-11 PROCEDURE — 81000 URINALYSIS NONAUTO W/SCOPE: CPT | Mod: ER

## 2019-11-11 PROCEDURE — 25000003 PHARM REV CODE 250: Mod: ER | Performed by: FAMILY MEDICINE

## 2019-11-11 PROCEDURE — 93010 ELECTROCARDIOGRAM REPORT: CPT | Mod: ,,, | Performed by: INTERNAL MEDICINE

## 2019-11-11 PROCEDURE — 85730 THROMBOPLASTIN TIME PARTIAL: CPT | Mod: ER

## 2019-11-11 PROCEDURE — 99284 EMERGENCY DEPT VISIT MOD MDM: CPT | Mod: 25,ER

## 2019-11-11 PROCEDURE — 80053 COMPREHEN METABOLIC PANEL: CPT | Mod: ER

## 2019-11-11 PROCEDURE — 82962 GLUCOSE BLOOD TEST: CPT | Mod: ER

## 2019-11-11 PROCEDURE — 93010 EKG 12-LEAD: ICD-10-PCS | Mod: 76,,, | Performed by: INTERNAL MEDICINE

## 2019-11-11 PROCEDURE — 83880 ASSAY OF NATRIURETIC PEPTIDE: CPT | Mod: ER

## 2019-11-11 PROCEDURE — 85025 COMPLETE CBC W/AUTO DIFF WBC: CPT | Mod: ER

## 2019-11-11 RX ORDER — CEPHALEXIN 500 MG/1
500 CAPSULE ORAL
Status: COMPLETED | OUTPATIENT
Start: 2019-11-11 | End: 2019-11-11

## 2019-11-11 RX ORDER — CEPHALEXIN 500 MG/1
500 CAPSULE ORAL EVERY 8 HOURS
Qty: 21 CAPSULE | Refills: 0 | Status: SHIPPED | OUTPATIENT
Start: 2019-11-11 | End: 2019-11-18

## 2019-11-11 RX ORDER — GABAPENTIN 100 MG/1
100 CAPSULE ORAL 2 TIMES DAILY
COMMUNITY

## 2019-11-11 RX ADMIN — CEPHALEXIN 500 MG: 500 CAPSULE ORAL at 11:11

## 2019-11-11 NOTE — ED PROVIDER NOTES
"Encounter Date: 2019       History     Chief Complaint   Patient presents with    Dizziness     "I was driving at 6:40 this morning and then I got dizzy and the car was spining and it lasted 3 minutes and then I got nauseated and weak"      49-year-old female complains of dizziness while she was driving a car this morning around 6:30 a.m..  Dizziness lasted for 2-3 minutes.  Says she fells lightheadedness and spinning.  Moderate nausea but no vomiting. No visual changes.  No chest pain or shortness of breath. Generalized weakness but no focal weakness. She did not take any medication this morning.  She did not eat anything so far.  Her blood sugar is 153.    The history is provided by the patient.     Review of patient's allergies indicates:   Allergen Reactions    Codeine      Past Medical History:   Diagnosis Date    Blood clot associated with vein wall inflammation     Diabetes mellitus     High cholesterol     Hypertension     Stroke      Past Surgical History:   Procedure Laterality Date    APPENDECTOMY      CARDIAC CATHETERIZATION      CARDIAC DEFIBRILLATOR PLACEMENT       SECTION       No family history on file.  Social History     Tobacco Use    Smoking status: Never Smoker    Smokeless tobacco: Never Used   Substance Use Topics    Alcohol use: No    Drug use: No     Review of Systems   Constitutional: Negative for activity change, appetite change, chills and fever.   HENT: Negative for congestion, ear discharge, ear pain, rhinorrhea, sinus pressure, sinus pain, sore throat and trouble swallowing.    Eyes: Negative for photophobia, pain, discharge, redness, itching and visual disturbance.   Respiratory: Negative for cough, chest tightness, shortness of breath and wheezing.    Cardiovascular: Negative for chest pain, palpitations and leg swelling.   Gastrointestinal: Negative for abdominal distention, abdominal pain, constipation, diarrhea, nausea and vomiting.   Genitourinary: " Negative for dysuria, flank pain, frequency and hematuria.   Musculoskeletal: Negative for back pain, gait problem, neck pain and neck stiffness.   Skin: Negative for rash and wound.   Neurological: Positive for dizziness and light-headedness. Negative for tremors, seizures, syncope, speech difficulty, weakness, numbness and headaches.   Psychiatric/Behavioral: Negative for behavioral problems, confusion, hallucinations and sleep disturbance. The patient is not nervous/anxious.    All other systems reviewed and are negative.      Physical Exam     Initial Vitals [11/11/19 0746]   BP Pulse Resp Temp SpO2   (!) 152/70 77 (!) 22 97.6 °F (36.4 °C) 99 %      MAP       --         Physical Exam    Nursing note and vitals reviewed.  Constitutional: Vital signs are normal. She appears well-developed and well-nourished. She is active.   HENT:   Head: Normocephalic and atraumatic.   Right Ear: External ear normal.   Nose: Nose normal.   Mouth/Throat: Oropharynx is clear and moist.   Eyes: Conjunctivae, EOM and lids are normal. Pupils are equal, round, and reactive to light.   Neck: Trachea normal, normal range of motion and full passive range of motion without pain. Neck supple. Normal range of motion present. No neck rigidity.   Cardiovascular: Normal rate, regular rhythm, S1 normal, S2 normal, normal heart sounds, intact distal pulses and normal pulses. Exam reveals no friction rub.    No murmur heard.  Pulmonary/Chest: Breath sounds normal. No respiratory distress. She has no wheezes. She has no rhonchi. She has no rales. She exhibits no tenderness.   Abdominal: Soft. Normal appearance and bowel sounds are normal. She exhibits no distension. There is no tenderness.   Musculoskeletal: Normal range of motion. She exhibits no edema or tenderness.   Lymphadenopathy:     She has no cervical adenopathy.   Neurological: She is alert and oriented to person, place, and time. She has normal strength and normal reflexes. No cranial  nerve deficit or sensory deficit. GCS score is 15. GCS eye subscore is 4. GCS verbal subscore is 5. GCS motor subscore is 6.   Skin: Skin is warm and intact. Capillary refill takes less than 2 seconds. No abrasion, no bruising and no rash noted.   Psychiatric: She has a normal mood and affect. Her speech is normal and behavior is normal. Judgment and thought content normal. She is not actively hallucinating. Cognition and memory are normal. She is attentive.         ED Course   Procedures  Labs Reviewed   POCT GLUCOSE - Abnormal; Notable for the following components:       Result Value    POCT Glucose 158 (*)     All other components within normal limits   CBC W/ AUTO DIFFERENTIAL   COMPREHENSIVE METABOLIC PANEL   TROPONIN I   B-TYPE NATRIURETIC PEPTIDE     EKG Readings: (Independently Interpreted)   Initial Reading: No STEMI. Rhythm: Normal Sinus Rhythm. Heart Rate: 72. Ectopy: No Ectopy. Conduction: Normal. ST Segments: Normal ST Segments. T Waves: Normal. Q Waves: V1, V2 and V3. Clinical Impression: Normal Sinus Rhythm       Imaging Results    None          Medical Decision Making:   Initial Assessment:   Patient complains of dizziness while she was driving her car.  Feeling much better on arrival to ED.  No chest pain, shortness of breath.  Normal glucose.  Normal blood pressure.  No recent infections.  Differential Diagnosis:   Benign positional vertigo, dizziness, posterior infarct,  Hypertension, hypoglycemia, orthostatic hypertension.  Clinical Tests:   Lab Tests: Ordered and Reviewed  Medical Tests: Ordered and Reviewed  ED Management:  Patient dizziness has improved on arrival to ED.  Very minimal dizziness on standing up.  No drop in blood pressure with orthostatics.  No nystagmus.  No focal deficits.  Patient declined CT due to claustrophobic.  Re-examination of her neurological condition is stable.  Dizziness - benign positional.  Patient also has urinary tract infection treated with Keflex.  Patient  will be discharged home in stable condition follow-up ED with worsening symptoms or PCP.                                 Clinical Impression:       ICD-10-CM ICD-9-CM   1. Dizziness R42 780.4   2. Acute cystitis without hematuria N30.00 595.0         Disposition:   Disposition: Discharged  Condition: Stable                     William Muniz MD  11/14/19 0155

## 2019-12-17 ENCOUNTER — LAB VISIT (OUTPATIENT)
Dept: LAB | Facility: HOSPITAL | Age: 49
End: 2019-12-17
Attending: INTERNAL MEDICINE
Payer: COMMERCIAL

## 2019-12-17 DIAGNOSIS — Z79.01 LONG TERM (CURRENT) USE OF ANTICOAGULANTS: Primary | ICD-10-CM

## 2019-12-17 LAB
INR PPP: 2.6 (ref 0.8–1.2)
PROTHROMBIN TIME: 27.5 SEC (ref 9–12.5)

## 2019-12-17 PROCEDURE — 36415 COLL VENOUS BLD VENIPUNCTURE: CPT | Mod: PO

## 2019-12-17 PROCEDURE — 85610 PROTHROMBIN TIME: CPT | Mod: PO

## 2020-01-23 ENCOUNTER — LAB VISIT (OUTPATIENT)
Dept: LAB | Facility: HOSPITAL | Age: 50
End: 2020-01-23
Attending: INTERNAL MEDICINE
Payer: COMMERCIAL

## 2020-01-23 DIAGNOSIS — Z79.01 LONG TERM (CURRENT) USE OF ANTICOAGULANTS: Primary | ICD-10-CM

## 2020-01-23 LAB
INR PPP: 2.2 (ref 0.8–1.2)
PROTHROMBIN TIME: 24 SEC (ref 9–12.5)

## 2020-01-23 PROCEDURE — 85610 PROTHROMBIN TIME: CPT | Mod: PO

## 2020-01-23 PROCEDURE — 36415 COLL VENOUS BLD VENIPUNCTURE: CPT | Mod: PO

## 2020-03-03 ENCOUNTER — LAB VISIT (OUTPATIENT)
Dept: LAB | Facility: HOSPITAL | Age: 50
End: 2020-03-03
Attending: INTERNAL MEDICINE
Payer: COMMERCIAL

## 2020-03-03 DIAGNOSIS — Z79.01 LONG TERM (CURRENT) USE OF ANTICOAGULANTS: Primary | ICD-10-CM

## 2020-03-03 LAB
INR PPP: 2.9 (ref 0.8–1.2)
PROTHROMBIN TIME: 30.6 SEC (ref 9–12.5)

## 2020-03-03 PROCEDURE — 36415 COLL VENOUS BLD VENIPUNCTURE: CPT | Mod: PO

## 2020-03-03 PROCEDURE — 85610 PROTHROMBIN TIME: CPT | Mod: PO

## 2020-05-19 ENCOUNTER — LAB VISIT (OUTPATIENT)
Dept: LAB | Facility: HOSPITAL | Age: 50
End: 2020-05-19
Attending: INTERNAL MEDICINE
Payer: COMMERCIAL

## 2020-05-19 DIAGNOSIS — Z79.01 LONG TERM (CURRENT) USE OF ANTICOAGULANTS: Primary | ICD-10-CM

## 2020-05-19 LAB
INR PPP: 2 (ref 0.8–1.2)
PROTHROMBIN TIME: 23.5 SEC (ref 9–12.5)

## 2020-05-19 PROCEDURE — 36415 COLL VENOUS BLD VENIPUNCTURE: CPT | Mod: PO

## 2020-05-19 PROCEDURE — 85610 PROTHROMBIN TIME: CPT | Mod: PO

## 2020-06-19 ENCOUNTER — LAB VISIT (OUTPATIENT)
Dept: LAB | Facility: HOSPITAL | Age: 50
End: 2020-06-19
Attending: INTERNAL MEDICINE
Payer: COMMERCIAL

## 2020-06-19 DIAGNOSIS — Z79.01 LONG TERM (CURRENT) USE OF ANTICOAGULANTS: Primary | ICD-10-CM

## 2020-06-19 LAB
INR PPP: 1.5 (ref 0.8–1.2)
PROTHROMBIN TIME: 16.1 SEC (ref 9–12.5)

## 2020-06-19 PROCEDURE — 36415 COLL VENOUS BLD VENIPUNCTURE: CPT | Mod: PO

## 2020-06-19 PROCEDURE — 85610 PROTHROMBIN TIME: CPT | Mod: PO

## 2020-06-30 ENCOUNTER — LAB VISIT (OUTPATIENT)
Dept: LAB | Facility: HOSPITAL | Age: 50
End: 2020-06-30
Attending: INTERNAL MEDICINE
Payer: COMMERCIAL

## 2020-06-30 DIAGNOSIS — Z79.01 LONG TERM (CURRENT) USE OF ANTICOAGULANTS: Primary | ICD-10-CM

## 2020-06-30 LAB
INR PPP: 2.3 (ref 0.8–1.2)
PROTHROMBIN TIME: 24.3 SEC (ref 9–12.5)

## 2020-06-30 PROCEDURE — 85610 PROTHROMBIN TIME: CPT | Mod: PO

## 2020-06-30 PROCEDURE — 36415 COLL VENOUS BLD VENIPUNCTURE: CPT | Mod: PO

## 2020-08-05 ENCOUNTER — LAB VISIT (OUTPATIENT)
Dept: LAB | Facility: HOSPITAL | Age: 50
End: 2020-08-05
Attending: INTERNAL MEDICINE
Payer: COMMERCIAL

## 2020-08-05 DIAGNOSIS — Z79.01 LONG TERM (CURRENT) USE OF ANTICOAGULANTS: Primary | ICD-10-CM

## 2020-08-05 LAB
INR PPP: 2.3 (ref 0.8–1.2)
PROTHROMBIN TIME: 24 SEC (ref 9–12.5)

## 2020-08-05 PROCEDURE — 85610 PROTHROMBIN TIME: CPT | Mod: PO

## 2020-08-05 PROCEDURE — 36415 COLL VENOUS BLD VENIPUNCTURE: CPT | Mod: PO

## 2020-09-10 ENCOUNTER — LAB VISIT (OUTPATIENT)
Dept: LAB | Facility: HOSPITAL | Age: 50
End: 2020-09-10
Attending: INTERNAL MEDICINE
Payer: COMMERCIAL

## 2020-09-10 DIAGNOSIS — Z79.01 LONG TERM (CURRENT) USE OF ANTICOAGULANTS: Primary | ICD-10-CM

## 2020-09-10 LAB
INR PPP: 2.2 (ref 0.8–1.2)
PROTHROMBIN TIME: 23.7 SEC (ref 9–12.5)

## 2020-09-10 PROCEDURE — 36415 COLL VENOUS BLD VENIPUNCTURE: CPT | Mod: PO

## 2020-09-10 PROCEDURE — 85610 PROTHROMBIN TIME: CPT | Mod: PO

## 2021-12-16 ENCOUNTER — LAB VISIT (OUTPATIENT)
Dept: LAB | Facility: HOSPITAL | Age: 51
End: 2021-12-16
Attending: NURSE PRACTITIONER
Payer: MEDICAID

## 2021-12-16 DIAGNOSIS — Z79.01 LONG TERM (CURRENT) USE OF ANTICOAGULANTS: Primary | ICD-10-CM

## 2021-12-16 LAB
INR PPP: 2.6 (ref 0.8–1.2)
PROTHROMBIN TIME: 25.9 SEC (ref 9–12.5)

## 2021-12-16 PROCEDURE — 85610 PROTHROMBIN TIME: CPT | Mod: PO | Performed by: NURSE PRACTITIONER

## 2021-12-16 PROCEDURE — 36415 COLL VENOUS BLD VENIPUNCTURE: CPT | Mod: PO | Performed by: NURSE PRACTITIONER

## 2022-02-18 ENCOUNTER — LAB VISIT (OUTPATIENT)
Dept: LAB | Facility: HOSPITAL | Age: 52
End: 2022-02-18
Attending: NURSE PRACTITIONER
Payer: COMMERCIAL

## 2022-02-18 DIAGNOSIS — Z79.01 ANTICOAGULANT LONG-TERM USE: Primary | ICD-10-CM

## 2022-02-18 LAB
INR PPP: 2 (ref 0.8–1.2)
PROTHROMBIN TIME: 19.7 SEC (ref 9–12.5)

## 2022-02-18 PROCEDURE — 85610 PROTHROMBIN TIME: CPT | Mod: PO | Performed by: NURSE PRACTITIONER

## 2022-02-18 PROCEDURE — 36415 COLL VENOUS BLD VENIPUNCTURE: CPT | Mod: PO | Performed by: NURSE PRACTITIONER

## 2022-06-08 ENCOUNTER — LAB VISIT (OUTPATIENT)
Dept: LAB | Facility: HOSPITAL | Age: 52
End: 2022-06-08
Attending: NURSE PRACTITIONER
Payer: COMMERCIAL

## 2022-06-08 DIAGNOSIS — Z79.01 ANTICOAGULANT LONG-TERM USE: Primary | ICD-10-CM

## 2022-06-08 LAB
INR PPP: 1.6 (ref 0.8–1.2)
PROTHROMBIN TIME: 17.1 SEC (ref 9–12.5)

## 2022-06-08 PROCEDURE — 36415 COLL VENOUS BLD VENIPUNCTURE: CPT | Mod: PO | Performed by: NURSE PRACTITIONER

## 2022-06-08 PROCEDURE — 85610 PROTHROMBIN TIME: CPT | Mod: PO | Performed by: NURSE PRACTITIONER

## 2023-02-09 ENCOUNTER — HOSPITAL ENCOUNTER (INPATIENT)
Facility: HOSPITAL | Age: 53
LOS: 1 days | Discharge: HOME | DRG: 291 | End: 2023-02-10
Attending: EMERGENCY MEDICINE | Admitting: FAMILY MEDICINE
Payer: COMMERCIAL

## 2023-02-09 ENCOUNTER — TELEPHONE (OUTPATIENT)
Dept: CARDIOLOGY | Facility: CLINIC | Age: 53
End: 2023-02-09

## 2023-02-09 DIAGNOSIS — J96.01 ACUTE RESPIRATORY FAILURE WITH HYPOXIA: ICD-10-CM

## 2023-02-09 DIAGNOSIS — I50.31 ACUTE DIASTOLIC CHF (CONGESTIVE HEART FAILURE), NYHA CLASS 3: Primary | ICD-10-CM

## 2023-02-09 DIAGNOSIS — R06.02 SOB (SHORTNESS OF BREATH): ICD-10-CM

## 2023-02-09 DIAGNOSIS — I50.9 CHF EXACERBATION: ICD-10-CM

## 2023-02-09 PROBLEM — E11.40 DIABETIC NEUROPATHY: Status: ACTIVE | Noted: 2023-02-09

## 2023-02-09 PROBLEM — Z79.4 TYPE 2 DIABETES MELLITUS, WITH LONG-TERM CURRENT USE OF INSULIN: Status: ACTIVE | Noted: 2023-02-09

## 2023-02-09 PROBLEM — I10 HYPERTENSION: Status: ACTIVE | Noted: 2023-02-09

## 2023-02-09 PROBLEM — E11.9 TYPE 2 DIABETES MELLITUS, WITH LONG-TERM CURRENT USE OF INSULIN: Status: ACTIVE | Noted: 2023-02-09

## 2023-02-09 PROBLEM — I42.8 NONISCHEMIC CARDIOMYOPATHY: Status: ACTIVE | Noted: 2023-02-09

## 2023-02-09 PROBLEM — E78.5 HYPERLIPIDEMIA: Status: ACTIVE | Noted: 2023-02-09

## 2023-02-09 PROBLEM — E66.01 SEVERE OBESITY (BMI >= 40): Status: ACTIVE | Noted: 2023-02-09

## 2023-02-09 PROBLEM — S91.109A OPEN TOE WOUND: Status: ACTIVE | Noted: 2023-02-09

## 2023-02-09 PROBLEM — I73.9 PAD (PERIPHERAL ARTERY DISEASE): Status: ACTIVE | Noted: 2023-02-09

## 2023-02-09 PROBLEM — I67.2 CEREBRAL ARTERIOSCLEROSIS WITH HISTORY OF PREVIOUS STROKE: Status: ACTIVE | Noted: 2023-02-09

## 2023-02-09 PROBLEM — G47.33 OSA (OBSTRUCTIVE SLEEP APNEA): Status: ACTIVE | Noted: 2023-02-09

## 2023-02-09 PROBLEM — Z86.73 CEREBRAL ARTERIOSCLEROSIS WITH HISTORY OF PREVIOUS STROKE: Status: ACTIVE | Noted: 2023-02-09

## 2023-02-09 LAB
ALBUMIN SERPL BCP-MCNC: 4.3 G/DL (ref 3.5–5.2)
ALP SERPL-CCNC: 77 U/L (ref 38–126)
ALT SERPL W/O P-5'-P-CCNC: 28 U/L (ref 10–44)
ANION GAP SERPL CALC-SCNC: 7 MMOL/L (ref 8–16)
AORTIC ROOT ANNULUS: 3.05 CM
AORTIC VALVE CUSP SEPERATION: 2.02 CM
AST SERPL-CCNC: 31 U/L (ref 15–46)
AV INDEX (PROSTH): 0.72
AV MEAN GRADIENT: 2 MMHG
AV PEAK GRADIENT: 4 MMHG
AV VALVE AREA: 2.25 CM2
AV VELOCITY RATIO: 0.66
BASOPHILS # BLD AUTO: 0.06 K/UL (ref 0–0.2)
BASOPHILS NFR BLD: 0.7 % (ref 0–1.9)
BILIRUB SERPL-MCNC: 0.4 MG/DL (ref 0.1–1)
BNP SERPL-MCNC: 521 PG/ML (ref 0–99)
BSA FOR ECHO PROCEDURE: 2.49 M2
CALCIUM SERPL-MCNC: 9.1 MG/DL (ref 8.7–10.5)
CHLORIDE SERPL-SCNC: 109 MMOL/L (ref 95–110)
CO2 SERPL-SCNC: 24 MMOL/L (ref 23–29)
CREAT SERPL-MCNC: 1.3 MG/DL (ref 0.5–1.4)
CV ECHO LV RWT: 0.33 CM
DIFFERENTIAL METHOD: ABNORMAL
DOP CALC AO PEAK VEL: 0.98 M/S
DOP CALC AO VTI: 16.3 CM
DOP CALC LVOT AREA: 3.1 CM2
DOP CALC LVOT DIAMETER: 2 CM
DOP CALC LVOT PEAK VEL: 0.65 M/S
DOP CALC LVOT STROKE VOLUME: 36.74 CM3
DOP CALC MV VTI: 21.8 CM
DOP CALCLVOT PEAK VEL VTI: 11.7 CM
ECHO LV POSTERIOR WALL: 1 CM (ref 0.6–1.1)
EJECTION FRACTION: 25 %
EOSINOPHIL # BLD AUTO: 0.4 K/UL (ref 0–0.5)
EOSINOPHIL NFR BLD: 4.4 % (ref 0–8)
ERYTHROCYTE [DISTWIDTH] IN BLOOD BY AUTOMATED COUNT: 14.6 % (ref 11.5–14.5)
EST. GFR  (NO RACE VARIABLE): 49.2 ML/MIN/1.73 M^2
FRACTIONAL SHORTENING: 14 % (ref 28–44)
GLUCOSE SERPL-MCNC: 152 MG/DL (ref 70–110)
HCT VFR BLD AUTO: 37.4 % (ref 37–48.5)
HGB BLD-MCNC: 11.6 G/DL (ref 12–16)
IMM GRANULOCYTES # BLD AUTO: 0.05 K/UL (ref 0–0.04)
IMM GRANULOCYTES NFR BLD AUTO: 0.6 % (ref 0–0.5)
INR PPP: 2 (ref 0.8–1.2)
INTERVENTRICULAR SEPTUM: 1.04 CM (ref 0.6–1.1)
IVRT: 72.31 MSEC
LA MAJOR: 5.6 CM
LA MINOR: 5.3 CM
LA WIDTH: 4.9 CM
LEFT ATRIUM SIZE: 4.7 CM
LEFT ATRIUM VOLUME INDEX: 45.6 ML/M2
LEFT ATRIUM VOLUME: 106.61 CM3
LEFT INTERNAL DIMENSION IN SYSTOLE: 5.22 CM (ref 2.1–4)
LEFT VENTRICLE DIASTOLIC VOLUME INDEX: 79.95 ML/M2
LEFT VENTRICLE DIASTOLIC VOLUME: 187.08 ML
LEFT VENTRICLE MASS INDEX: 111 G/M2
LEFT VENTRICLE SYSTOLIC VOLUME INDEX: 55.8 ML/M2
LEFT VENTRICLE SYSTOLIC VOLUME: 130.59 ML
LEFT VENTRICULAR INTERNAL DIMENSION IN DIASTOLE: 6.1 CM (ref 3.5–6)
LEFT VENTRICULAR MASS: 260.49 G
LVOT MG: 0.81 MMHG
LVOT MV: 0.42 CM/S
LYMPHOCYTES # BLD AUTO: 2.3 K/UL (ref 1–4.8)
LYMPHOCYTES NFR BLD: 27.5 % (ref 18–48)
MAGNESIUM SERPL-MCNC: 1.5 MG/DL (ref 1.6–2.6)
MCH RBC QN AUTO: 28.2 PG (ref 27–31)
MCHC RBC AUTO-ENTMCNC: 31 G/DL (ref 32–36)
MCV RBC AUTO: 91 FL (ref 82–98)
MONOCYTES # BLD AUTO: 0.6 K/UL (ref 0.3–1)
MONOCYTES NFR BLD: 6.7 % (ref 4–15)
MV MEAN GRADIENT: 1 MMHG
MV PEAK GRADIENT: 4 MMHG
MV VALVE AREA BY CONTINUITY EQUATION: 1.69 CM2
NEUTROPHILS # BLD AUTO: 4.9 K/UL (ref 1.8–7.7)
NEUTROPHILS NFR BLD: 60.1 % (ref 38–73)
NRBC BLD-RTO: 0 /100 WBC
NT-PROBNP SERPL-MCNC: 1120 PG/ML (ref 5–900)
PISA MRMAX VEL: 3.48 M/S
PISA TR MAX VEL: 2.07 M/S
PLATELET # BLD AUTO: 277 K/UL (ref 150–450)
PMV BLD AUTO: 10.3 FL (ref 9.2–12.9)
POCT GLUCOSE: 177 MG/DL (ref 70–110)
POCT GLUCOSE: 196 MG/DL (ref 70–110)
POCT GLUCOSE: 209 MG/DL (ref 70–110)
POTASSIUM SERPL-SCNC: 4.3 MMOL/L (ref 3.5–5.1)
PROT SERPL-MCNC: 7.8 G/DL (ref 6–8.4)
PROTHROMBIN TIME: 20.9 SEC (ref 9–12.5)
PULM VEIN S/D RATIO: 0.78
PV MV: 0.41 M/S
PV PEAK D VEL: 0.37 M/S
PV PEAK S VEL: 0.29 M/S
PV PEAK VELOCITY: 0.58 CM/S
RA MAJOR: 5.3 CM
RA WIDTH: 4.1 CM
RBC # BLD AUTO: 4.12 M/UL (ref 4–5.4)
RIGHT VENTRICULAR END-DIASTOLIC DIMENSION: 2.9 CM
RIGHT VENTRICULAR LENGTH IN DIASTOLE (APICAL 4-CHAMBER VIEW): 6.1 CM
SODIUM SERPL-SCNC: 140 MMOL/L (ref 136–145)
TDI LATERAL: 0.05 M/S
TDI SEPTAL: 0.05 M/S
TDI: 0.05 M/S
TR MAX PG: 17 MMHG
TRICUSPID ANNULAR PLANE SYSTOLIC EXCURSION: 2 CM
TROPONIN I SERPL-MCNC: <0.012 NG/ML (ref 0.01–0.03)
UUN UR-MCNC: 20 MG/DL (ref 7–17)
WBC # BLD AUTO: 8.19 K/UL (ref 3.9–12.7)

## 2023-02-09 PROCEDURE — 99900035 HC TECH TIME PER 15 MIN (STAT)

## 2023-02-09 PROCEDURE — 25000003 PHARM REV CODE 250: Performed by: NURSE PRACTITIONER

## 2023-02-09 PROCEDURE — 63600175 PHARM REV CODE 636 W HCPCS: Performed by: FAMILY MEDICINE

## 2023-02-09 PROCEDURE — 99900035 HC TECH TIME PER 15 MIN (STAT): Mod: ER

## 2023-02-09 PROCEDURE — 94761 N-INVAS EAR/PLS OXIMETRY MLT: CPT

## 2023-02-09 PROCEDURE — 85610 PROTHROMBIN TIME: CPT | Mod: ER | Performed by: EMERGENCY MEDICINE

## 2023-02-09 PROCEDURE — 83880 ASSAY OF NATRIURETIC PEPTIDE: CPT | Mod: ER | Performed by: EMERGENCY MEDICINE

## 2023-02-09 PROCEDURE — 93010 EKG 12-LEAD: ICD-10-PCS | Mod: ,,, | Performed by: INTERNAL MEDICINE

## 2023-02-09 PROCEDURE — 63600175 PHARM REV CODE 636 W HCPCS: Mod: ER | Performed by: EMERGENCY MEDICINE

## 2023-02-09 PROCEDURE — 99223 PR INITIAL HOSPITAL CARE,LEVL III: ICD-10-PCS | Mod: ,,, | Performed by: NURSE PRACTITIONER

## 2023-02-09 PROCEDURE — 80053 COMPREHEN METABOLIC PANEL: CPT | Mod: ER | Performed by: EMERGENCY MEDICINE

## 2023-02-09 PROCEDURE — 83036 HEMOGLOBIN GLYCOSYLATED A1C: CPT | Performed by: FAMILY MEDICINE

## 2023-02-09 PROCEDURE — 36415 COLL VENOUS BLD VENIPUNCTURE: CPT | Performed by: FAMILY MEDICINE

## 2023-02-09 PROCEDURE — 83880 ASSAY OF NATRIURETIC PEPTIDE: CPT | Mod: 91 | Performed by: FAMILY MEDICINE

## 2023-02-09 PROCEDURE — 99223 1ST HOSP IP/OBS HIGH 75: CPT | Mod: ,,, | Performed by: NURSE PRACTITIONER

## 2023-02-09 PROCEDURE — 93010 ELECTROCARDIOGRAM REPORT: CPT | Mod: ,,, | Performed by: INTERNAL MEDICINE

## 2023-02-09 PROCEDURE — 99291 CRITICAL CARE FIRST HOUR: CPT | Mod: 25,ER

## 2023-02-09 PROCEDURE — 84484 ASSAY OF TROPONIN QUANT: CPT | Mod: ER | Performed by: EMERGENCY MEDICINE

## 2023-02-09 PROCEDURE — 94760 N-INVAS EAR/PLS OXIMETRY 1: CPT | Mod: ER

## 2023-02-09 PROCEDURE — 11000001 HC ACUTE MED/SURG PRIVATE ROOM

## 2023-02-09 PROCEDURE — 93005 ELECTROCARDIOGRAM TRACING: CPT | Mod: ER

## 2023-02-09 PROCEDURE — 96374 THER/PROPH/DIAG INJ IV PUSH: CPT | Mod: ER

## 2023-02-09 PROCEDURE — 25000003 PHARM REV CODE 250: Performed by: FAMILY MEDICINE

## 2023-02-09 PROCEDURE — 25500020 PHARM REV CODE 255: Performed by: INTERNAL MEDICINE

## 2023-02-09 PROCEDURE — 83735 ASSAY OF MAGNESIUM: CPT | Performed by: FAMILY MEDICINE

## 2023-02-09 PROCEDURE — 85025 COMPLETE CBC W/AUTO DIFF WBC: CPT | Mod: ER | Performed by: EMERGENCY MEDICINE

## 2023-02-09 PROCEDURE — 27000221 HC OXYGEN, UP TO 24 HOURS: Mod: ER

## 2023-02-09 RX ORDER — SEMAGLUTIDE 1.34 MG/ML
1 INJECTION, SOLUTION SUBCUTANEOUS
COMMUNITY
Start: 2023-01-11

## 2023-02-09 RX ORDER — CIPROFLOXACIN 500 MG/1
500 TABLET ORAL EVERY 12 HOURS
Status: DISCONTINUED | OUTPATIENT
Start: 2023-02-09 | End: 2023-02-10 | Stop reason: HOSPADM

## 2023-02-09 RX ORDER — IBUPROFEN 200 MG
24 TABLET ORAL
Status: DISCONTINUED | OUTPATIENT
Start: 2023-02-09 | End: 2023-02-10 | Stop reason: HOSPADM

## 2023-02-09 RX ORDER — SODIUM CHLORIDE 0.9 % (FLUSH) 0.9 %
10 SYRINGE (ML) INJECTION
Status: DISCONTINUED | OUTPATIENT
Start: 2023-02-09 | End: 2023-02-10 | Stop reason: HOSPADM

## 2023-02-09 RX ORDER — WARFARIN SODIUM 5 MG/1
5 TABLET ORAL
Status: DISCONTINUED | OUTPATIENT
Start: 2023-02-10 | End: 2023-02-09

## 2023-02-09 RX ORDER — METOPROLOL SUCCINATE 100 MG/1
1 TABLET, EXTENDED RELEASE ORAL 2 TIMES DAILY
Status: ON HOLD | COMMUNITY
Start: 2021-11-23 | End: 2023-02-10 | Stop reason: HOSPADM

## 2023-02-09 RX ORDER — METOPROLOL SUCCINATE 25 MG/1
25 TABLET, EXTENDED RELEASE ORAL DAILY
Status: DISCONTINUED | OUTPATIENT
Start: 2023-02-09 | End: 2023-02-10 | Stop reason: HOSPADM

## 2023-02-09 RX ORDER — GLUCAGON 1 MG
1 KIT INJECTION
Status: DISCONTINUED | OUTPATIENT
Start: 2023-02-09 | End: 2023-02-10 | Stop reason: HOSPADM

## 2023-02-09 RX ORDER — DULOXETIN HYDROCHLORIDE 60 MG/1
60 CAPSULE, DELAYED RELEASE ORAL 2 TIMES DAILY
COMMUNITY
Start: 2022-02-25

## 2023-02-09 RX ORDER — LACTULOSE 10 G/15ML
10 SOLUTION ORAL; RECTAL EVERY 4 HOURS PRN
COMMUNITY
Start: 2022-12-08

## 2023-02-09 RX ORDER — FUROSEMIDE 10 MG/ML
40 INJECTION INTRAMUSCULAR; INTRAVENOUS
Status: COMPLETED | OUTPATIENT
Start: 2023-02-09 | End: 2023-02-09

## 2023-02-09 RX ORDER — GLIMEPIRIDE 4 MG/1
8 TABLET ORAL
Status: ON HOLD | COMMUNITY
Start: 2022-02-01 | End: 2023-02-10 | Stop reason: HOSPADM

## 2023-02-09 RX ORDER — WARFARIN SODIUM 5 MG/1
5 TABLET ORAL DAILY
Status: DISCONTINUED | OUTPATIENT
Start: 2023-02-09 | End: 2023-02-10 | Stop reason: HOSPADM

## 2023-02-09 RX ORDER — IBUPROFEN 200 MG
16 TABLET ORAL
Status: DISCONTINUED | OUTPATIENT
Start: 2023-02-09 | End: 2023-02-10 | Stop reason: HOSPADM

## 2023-02-09 RX ORDER — WARFARIN SODIUM 5 MG/1
5 TABLET ORAL
Status: DISCONTINUED | OUTPATIENT
Start: 2023-02-11 | End: 2023-02-09

## 2023-02-09 RX ORDER — FUROSEMIDE 10 MG/ML
40 INJECTION INTRAMUSCULAR; INTRAVENOUS
Status: DISCONTINUED | OUTPATIENT
Start: 2023-02-09 | End: 2023-02-10

## 2023-02-09 RX ORDER — SPIRONOLACTONE 25 MG/1
25 TABLET ORAL DAILY
Status: DISCONTINUED | OUTPATIENT
Start: 2023-02-09 | End: 2023-02-10 | Stop reason: HOSPADM

## 2023-02-09 RX ORDER — SPIRONOLACTONE 25 MG/1
1 TABLET ORAL DAILY
COMMUNITY
Start: 2021-11-19

## 2023-02-09 RX ORDER — DULOXETIN HYDROCHLORIDE 30 MG/1
60 CAPSULE, DELAYED RELEASE ORAL 2 TIMES DAILY
Status: DISCONTINUED | OUTPATIENT
Start: 2023-02-09 | End: 2023-02-10 | Stop reason: HOSPADM

## 2023-02-09 RX ORDER — INSULIN ASPART 100 [IU]/ML
0-5 INJECTION, SOLUTION INTRAVENOUS; SUBCUTANEOUS
Status: DISCONTINUED | OUTPATIENT
Start: 2023-02-09 | End: 2023-02-10 | Stop reason: HOSPADM

## 2023-02-09 RX ORDER — ONDANSETRON 8 MG/1
8 TABLET, ORALLY DISINTEGRATING ORAL EVERY 6 HOURS PRN
Status: DISCONTINUED | OUTPATIENT
Start: 2023-02-09 | End: 2023-02-10 | Stop reason: HOSPADM

## 2023-02-09 RX ADMIN — SACUBITRIL AND VALSARTAN 1 TABLET: 97; 103 TABLET, FILM COATED ORAL at 09:02

## 2023-02-09 RX ADMIN — METOPROLOL SUCCINATE 25 MG: 25 TABLET, EXTENDED RELEASE ORAL at 02:02

## 2023-02-09 RX ADMIN — WARFARIN SODIUM 5 MG: 5 TABLET ORAL at 05:02

## 2023-02-09 RX ADMIN — INSULIN ASPART 1 UNITS: 100 INJECTION, SOLUTION INTRAVENOUS; SUBCUTANEOUS at 09:02

## 2023-02-09 RX ADMIN — ONDANSETRON 8 MG: 8 TABLET, ORALLY DISINTEGRATING ORAL at 06:02

## 2023-02-09 RX ADMIN — DULOXETINE 60 MG: 30 CAPSULE, DELAYED RELEASE ORAL at 09:02

## 2023-02-09 RX ADMIN — SPIRONOLACTONE 25 MG: 25 TABLET, FILM COATED ORAL at 02:02

## 2023-02-09 RX ADMIN — FUROSEMIDE 40 MG: 10 INJECTION, SOLUTION INTRAMUSCULAR; INTRAVENOUS at 01:02

## 2023-02-09 RX ADMIN — SACUBITRIL AND VALSARTAN 1 TABLET: 97; 103 TABLET, FILM COATED ORAL at 02:02

## 2023-02-09 RX ADMIN — CIPROFLOXACIN HYDROCHLORIDE 500 MG: 500 TABLET, FILM COATED ORAL at 05:02

## 2023-02-09 RX ADMIN — FUROSEMIDE 40 MG: 10 INJECTION, SOLUTION INTRAMUSCULAR; INTRAVENOUS at 02:02

## 2023-02-09 RX ADMIN — INSULIN DETEMIR 10 UNITS: 100 INJECTION, SOLUTION SUBCUTANEOUS at 09:02

## 2023-02-09 RX ADMIN — HUMAN ALBUMIN MICROSPHERES AND PERFLUTREN 0.11 MG: 10; .22 INJECTION, SOLUTION INTRAVENOUS at 12:02

## 2023-02-09 NOTE — ASSESSMENT & PLAN NOTE
Followed by vascular surgery and WOC at [facility] with plans for US and angiogram soon  Medical management for now with asa, Statin, ARB  Continue Cipro

## 2023-02-09 NOTE — SUBJECTIVE & OBJECTIVE
Past Medical History:   Diagnosis Date    Blood clot associated with vein wall inflammation     CHF (congestive heart failure)     Diabetes mellitus     High cholesterol     Hypertension     Mixed hyperlipidemia     NICM (nonischemic cardiomyopathy)     ROMEL (obstructive sleep apnea)     PAD (peripheral artery disease)     PAD (peripheral artery disease)     Stroke        Past Surgical History:   Procedure Laterality Date    APPENDECTOMY      CARDIAC CATHETERIZATION      CARDIAC DEFIBRILLATOR PLACEMENT       SECTION         Review of patient's allergies indicates:   Allergen Reactions    Codeine        No current facility-administered medications on file prior to encounter.     Current Outpatient Medications on File Prior to Encounter   Medication Sig    carvedilol (COREG) 25 MG tablet Take 25 mg by mouth 2 (two) times daily with meals. 1.5 pills twice daily    furosemide (LASIX) 80 MG tablet Take 80 mg by mouth once daily.    gabapentin (NEURONTIN) 100 MG capsule Take 100 mg by mouth 3 (three) times daily.    glipiZIDE (GLUCOTROL) 5 MG TR24 Take 5 mg by mouth after lunch.    insulin glargine (LANTUS) 100 unit/mL injection Inject into the skin every evening.    insulin lispro (HUMALOG) 100 unit/mL injection Inject into the skin 3 (three) times daily before meals.    lisinopril (PRINIVIL,ZESTRIL) 20 MG tablet Take 20 mg by mouth once daily.    LORazepam (ATIVAN) 1 MG tablet Take 1 tablet (1 mg total) by mouth 2 (two) times daily. As needed for muscle tightness    metformin (GLUCOPHAGE) 1000 MG tablet Take 1,000 mg by mouth every evening.    mometasone (NASONEX) 50 mcg/actuation nasal spray 2 sprays by Nasal route once daily.    rosuvastatin (CRESTOR) 20 MG tablet Take 40 mg by mouth once daily.    warfarin (COUMADIN) 5 MG tablet Take 5 mg by mouth once daily. Sun, Mon, Wed, Fri, Sat  On Tues and  i take 7.5 mg     Family History    None       Tobacco Use    Smoking status: Never    Smokeless tobacco:  Never   Substance and Sexual Activity    Alcohol use: No    Drug use: No    Sexual activity: Not on file     Review of Systems   Constitutional:  Positive for activity change, appetite change and fatigue. Negative for diaphoresis and fever.   HENT:  Negative for congestion.    Eyes:  Negative for photophobia and visual disturbance.   Respiratory:  Positive for cough and shortness of breath. Negative for chest tightness.    Cardiovascular:  Positive for leg swelling. Negative for palpitations.   Endocrine: Negative for cold intolerance and heat intolerance.   Genitourinary:  Negative for dysuria.   Musculoskeletal:  Negative for arthralgias and neck stiffness.   Skin:  Negative for color change and wound.   Neurological:  Positive for weakness.   Psychiatric/Behavioral:  Negative for agitation.    Objective:     Vital Signs (Most Recent):  Temp: 96.8 °F (36 °C) (02/09/23 0845)  Pulse: 95 (02/09/23 0845)  Resp: 18 (02/09/23 0845)  BP: 137/74 (02/09/23 0845)  SpO2: 100 % (02/09/23 0845) Vital Signs (24h Range):  Temp:  [96.8 °F (36 °C)-98.3 °F (36.8 °C)] 96.8 °F (36 °C)  Pulse:  [] 95  Resp:  [18-26] 18  SpO2:  [88 %-100 %] 100 %  BP: (137-179)/(65-90) 137/74     Weight: 135.2 kg (298 lb)  Body mass index is 49.59 kg/m².    Physical Exam  Constitutional:       Appearance: She is obese. She is not diaphoretic.   HENT:      Head: Normocephalic and atraumatic.      Nose: Nose normal.   Eyes:      Pupils: Pupils are equal, round, and reactive to light.   Cardiovascular:      Rate and Rhythm: Normal rate.   Pulmonary:      Effort: Pulmonary effort is normal.   Abdominal:      General: Bowel sounds are normal.      Palpations: Abdomen is soft.      Comments: obese   Musculoskeletal:      Cervical back: Normal range of motion.      Right lower leg: Edema present.      Left lower leg: Edema present.   Skin:     General: Skin is warm.      Comments: Left fifth digit dressing in place   Neurological:      General: No focal  deficit present.      Mental Status: She is alert and oriented to person, place, and time. Mental status is at baseline.   Psychiatric:         Mood and Affect: Mood normal.         Behavior: Behavior normal.         CRANIAL NERVES     CN III, IV, VI   Pupils are equal, round, and reactive to light.     Significant Labs: A1C: No results for input(s): HGBA1C in the last 4320 hours.  ABGs: No results for input(s): PH, PCO2, HCO3, POCSATURATED, BE, TOTALHB, COHB, METHB, O2HB, POCFIO2, PO2 in the last 48 hours.  Blood Culture: No results for input(s): LABBLOO in the last 48 hours.  CBC:   Recent Labs   Lab 02/09/23 0153   WBC 8.19   HGB 11.6*   HCT 37.4        CMP:   Recent Labs   Lab 02/09/23 0153      K 4.3      CO2 24   *   BUN 20*   CREATININE 1.30   CALCIUM 9.1   PROT 7.8   ALBUMIN 4.3   BILITOT 0.4   ALKPHOS 77   AST 31   ALT 28   ANIONGAP 7*     Lactic Acid: No results for input(s): LACTATE in the last 48 hours.  Lipase: No results for input(s): LIPASE in the last 48 hours.  Lipid Panel: No results for input(s): CHOL, HDL, LDLCALC, TRIG, CHOLHDL in the last 48 hours.  Magnesium: No results for input(s): MG in the last 48 hours.  Troponin:   Recent Labs   Lab 02/09/23 0153   TROPONINI <0.012     TSH: No results for input(s): TSH in the last 4320 hours.  Urine Culture: No results for input(s): LABURIN in the last 48 hours.  Urine Studies: No results for input(s): COLORU, APPEARANCEUA, PHUR, SPECGRAV, PROTEINUA, GLUCUA, KETONESU, BILIRUBINUA, OCCULTUA, NITRITE, UROBILINOGEN, LEUKOCYTESUR, RBCUA, WBCUA, BACTERIA, SQUAMEPITHEL, HYALINECASTS in the last 48 hours.    Invalid input(s): WRIGHTSUR    Significant Imaging: I have reviewed all pertinent imaging results/findings within the past 24 hours.

## 2023-02-09 NOTE — SUBJECTIVE & OBJECTIVE
Past Medical History:   Diagnosis Date    Blood clot associated with vein wall inflammation     Diabetes mellitus     High cholesterol     Hypertension     NICM (nonischemic cardiomyopathy)     PAD (peripheral artery disease)     Stroke        Past Surgical History:   Procedure Laterality Date    APPENDECTOMY      CARDIAC CATHETERIZATION      CARDIAC DEFIBRILLATOR PLACEMENT       SECTION         Review of patient's allergies indicates:   Allergen Reactions    Codeine        No current facility-administered medications on file prior to encounter.     Current Outpatient Medications on File Prior to Encounter   Medication Sig    carvedilol (COREG) 25 MG tablet Take 25 mg by mouth 2 (two) times daily with meals. 1.5 pills twice daily    furosemide (LASIX) 80 MG tablet Take 80 mg by mouth once daily.    gabapentin (NEURONTIN) 100 MG capsule Take 100 mg by mouth 3 (three) times daily.    glipiZIDE (GLUCOTROL) 5 MG TR24 Take 5 mg by mouth after lunch.    insulin glargine (LANTUS) 100 unit/mL injection Inject into the skin every evening.    insulin lispro (HUMALOG) 100 unit/mL injection Inject into the skin 3 (three) times daily before meals.    LORazepam (ATIVAN) 1 MG tablet Take 1 tablet (1 mg total) by mouth 2 (two) times daily. As needed for muscle tightness    metformin (GLUCOPHAGE) 1000 MG tablet Take 1,000 mg by mouth every evening.    mometasone (NASONEX) 50 mcg/actuation nasal spray 2 sprays by Nasal route once daily.    rosuvastatin (CRESTOR) 20 MG tablet Take 40 mg by mouth once daily.    warfarin (COUMADIN) 5 MG tablet Take 5 mg by mouth once daily. Sun, Mon, Wed, Fri, Sat  On Tues and  i take 7.5 mg    [DISCONTINUED] lisinopril (PRINIVIL,ZESTRIL) 20 MG tablet Take 20 mg by mouth once daily.     Family History    None       Tobacco Use    Smoking status: Never    Smokeless tobacco: Never   Substance and Sexual Activity    Alcohol use: No    Drug use: No    Sexual activity: Not on file     Review  of Systems   Constitutional: Negative. Negative for diaphoresis.   HENT: Negative.     Eyes: Negative.    Cardiovascular:  Positive for dyspnea on exertion and leg swelling. Negative for chest pain, near-syncope, orthopnea, palpitations, paroxysmal nocturnal dyspnea and syncope.   Respiratory:  Positive for shortness of breath.    Endocrine: Negative.    Hematologic/Lymphatic: Negative.    Skin:  Positive for poor wound healing.   Gastrointestinal:  Negative for abdominal pain, nausea and vomiting.   Genitourinary: Negative.    Neurological: Negative.    Psychiatric/Behavioral: Negative.     Objective:     Vital Signs (Most Recent):  Temp: 97.2 °F (36.2 °C) (02/09/23 1125)  Pulse: 97 (02/09/23 1125)  Resp: 18 (02/09/23 1125)  BP: 131/74 (02/09/23 1125)  SpO2: 97 % (02/09/23 1125) Vital Signs (24h Range):  Temp:  [96.8 °F (36 °C)-98.3 °F (36.8 °C)] 97.2 °F (36.2 °C)  Pulse:  [] 97  Resp:  [18-26] 18  SpO2:  [88 %-100 %] 97 %  BP: (131-179)/(65-90) 131/74     Weight: 135.2 kg (298 lb)  Body mass index is 49.59 kg/m².    SpO2: 97 %         Intake/Output Summary (Last 24 hours) at 2/9/2023 1220  Last data filed at 2/9/2023 0430  Gross per 24 hour   Intake --   Output 950 ml   Net -950 ml       Lines/Drains/Airways       Peripheral Intravenous Line  Duration                  Peripheral IV - Single Lumen 02/09/23 0147 20 G Right Antecubital <1 day                    Physical Exam  Constitutional:       General: She is not in acute distress.     Appearance: She is obese. She is not diaphoretic.   HENT:      Head: Atraumatic.   Eyes:      General:         Right eye: No discharge.         Left eye: No discharge.   Cardiovascular:      Rate and Rhythm: Normal rate and regular rhythm.   Pulmonary:      Breath sounds: Rales present.   Abdominal:      General: Bowel sounds are normal.      Palpations: Abdomen is soft.   Musculoskeletal:      Right lower leg: Edema present.      Left lower leg: Edema present.   Skin:      General: Skin is warm and dry.   Neurological:      Mental Status: She is alert and oriented to person, place, and time.   Psychiatric:         Mood and Affect: Mood normal.         Behavior: Behavior normal.         Thought Content: Thought content normal.         Judgment: Judgment normal.       Significant Labs: BMP:   Recent Labs   Lab 02/09/23 0153   *      K 4.3      CO2 24   BUN 20*   CREATININE 1.30   CALCIUM 9.1   , CMP   Recent Labs   Lab 02/09/23 0153      K 4.3      CO2 24   *   BUN 20*   CREATININE 1.30   CALCIUM 9.1   PROT 7.8   ALBUMIN 4.3   BILITOT 0.4   ALKPHOS 77   AST 31   ALT 28   ANIONGAP 7*   , CBC   Recent Labs   Lab 02/09/23 0153   WBC 8.19   HGB 11.6*   HCT 37.4      , INR   Recent Labs   Lab 02/09/23 0153   INR 2.0*   , Lipid Panel No results for input(s): CHOL, HDL, LDLCALC, TRIG, CHOLHDL in the last 48 hours., Troponin   Recent Labs   Lab 02/09/23 0153   TROPONINI <0.012   , and All pertinent lab results from the last 24 hours have been reviewed.    Significant Imaging: Echocardiogram: Transthoracic echo (TTE) complete (Cupid Only): No results found for this or any previous visit.

## 2023-02-09 NOTE — TREATMENT PLAN
[facility] RN met with patient and family in room regarding [facility] follow up upon discharge. Pt agreeable to hospital follow up .RN informed pt of scheduled appointment and that appointment will also appear on d/c AVS. Patient informed to bring all medication bottles to [facility] follow up appointment.  [facility] information handout, appointment letter and folder provided to patient. Pt able to drive self to appointment    Patient Contact info:Contact Information   [phone]    Person providing transportation contact info: pt drives    Barriers to attending [facility] visit: lives in [place], [place]    Future Appointments   Date Time Provider Department Center   2/22/2023  9:00 AM Poonam Ellis MD [facility] [place]

## 2023-02-09 NOTE — NURSING
Patient arrived to unit from [facility] ED. Vitals obtained and assessment performed. MD notified of patient's arrival to unit. VN also notified to complete admit assessment.    Patient/Caregiver provided printed discharge information.

## 2023-02-09 NOTE — ASSESSMENT & PLAN NOTE
Diabetic neuropathy  Patient's FSGs are uncontrolled due to hyperglycemia on current medication regimen.  Last A1c reviewed- No results found for: LABA1C, HGBA1C  Most recent fingerstick glucose reviewed- No results for input(s): POCTGLUCOSE in the last 24 hours.  Current correctional scale  Low  Maintain anti-hyperglycemic dose as follows-   Antihyperglycemics (From admission, onward)    None        Hold Oral hypoglycemics while patient is in the hospital.

## 2023-02-09 NOTE — HPI
Gama Oenil is a 52 year old female with NICM, obesity, PAD with left toe wound, DM, AICD, HLD, PAF, CVA . Patient is followed by  Cardiology, wound care, and vascular surgery. Patient presented to the ED with a 1 week history of worsening SOB. She reports compliance with medication and the only recent medication change was the addition of cipro for her toe wound. She believes that her last known LVEF was ~ 30%. Patient reports that she ate a turkey sandwich PTA which is out of the ordinary for her. She is usually compliant with her low sodium diet. Patient denies chest pain, palpitations, NV, worsening edema, diaphoresis. EKG ST without acute ischemic changes. Troponin negative. TTE is pending. INR 2. Patient diuresing with IV Lasix and is feeling better.

## 2023-02-09 NOTE — CONSULTS
Food & Nutrition  Education    Diet Education: Fluid Restricted/Low Sodium Diet      Nutrition Education Handouts Provided: Low Salt Diet, Fluid Restricted Diet      Comments: RD remote for coverage, Pt admitted for CHF exacerbation with PMH of NICM, obesity, PAD with left toe wound, DM, AICD, HLD, PAF, CVA . Endorses compliance with meds. Pt also reports that she ate a turkey sandwich PTA which is out of the ordinary for her. She is usually compliant with her low sodium diet. RD remote for coverage handouts attached to discharge documents, RD to follow.      Follow-Up: Yes    Please Re-consult as needed        Thanks!

## 2023-02-09 NOTE — ED NOTES
Pt bed soiled in urine d/t purewick failure. Pt was able to stand at bedside with assist d/t significant SOB with exertion. Bed cleaned, linens replaced and a new purwick in place to try again.

## 2023-02-09 NOTE — PLAN OF CARE
02/09/23 1014   Admission   Initial VN Admission Questions Complete   Communication Issues? None   Shift   Virtual Nurse - Rounding Complete   Pain Management Interventions care clustered;quiet environment facilitated;relaxation techniques promoted   Virtual Nurse - Patient Verbalized Approval Of Camera Use;VN Rounding   Type of Frequent Check   Type Patient Rounds;Telemetry Monitoring;Other (see comments)  (new admission)   Safety/Activity   Patient Rounds bed in low position;bed wheels locked;call light in patient/parent reach;clutter free environment maintained;visualized patient;placement of personal items at bedside;ID band on   Safety Promotion/Fall Prevention assistive device/personal item within reach;bed alarm set;diversional activities provided;Fall Risk reviewed with patient/family;Fall Risk signage in place;nonskid shoes/socks when out of bed;side rails raised x 2;instructed to call staff for mobility   Safety Precautions emergency equipment at bedside   Activity Management Rolling - L1   Positioning   Body Position position changed independently;supine   Head of Bed (HOB) Positioning HOB at 30-45 degrees   Pain/Comfort/Sleep   Preferred Pain Scale number (Numeric Rating Pain Scale)   VN cued into patient's room for introduction with patient's permission.  VN role explained and informed patient that VN would be working with bedside nurse and rest of care team.  Fall risk and bed alarm protocol education provided.  Instructed patient to call for assistance.  Patient aware and agreeable.  Patient's chart, labs and vital signs reviewed.  Allowed time for questions.  No acute distress noted.  Will continue to be available as needed.

## 2023-02-09 NOTE — ASSESSMENT & PLAN NOTE
Diabetic neuropathy  Patient's FSGs are uncontrolled due to hyperglycemia on current medication regimen.  Last A1c reviewed- No results found for: LABA1C, HGBA1C  Most recent fingerstick glucose reviewed-   Recent Labs   Lab 02/09/23  1402   POCTGLUCOSE 177*     Current correctional scale  Low  Maintain anti-hyperglycemic dose as follows-   Antihyperglycemics (From admission, onward)    Start     Stop Route Frequency Ordered    02/09/23 2100  insulin detemir U-100 pen 10 Units         -- SubQ 2 times daily 02/09/23 1349    02/09/23 1449  insulin aspart U-100 pen 0-5 Units         -- SubQ Before meals & nightly PRN 02/09/23 1349        Hold Oral hypoglycemics while patient is in the hospital.  On Ozempic next dose due 2/10

## 2023-02-09 NOTE — ED PROVIDER NOTES
ED Provider Note - 2023    History     Chief Complaint   Patient presents with    Shortness of Breath     Reports to ED c c/o SOB since 2200 last night. States shortwinded walking to bed. +Cough. Hx of diabetes and stroke     Patient currently presents with concern regarding shortness of breath.  This began around 10:00 p.m. last evening.  Patient has a known history of congestive heart failure.  Patient denies history of COPD or asthma.  She denies associated fever, chills, or chest pain.  Patient notes that she typically takes 20 mg of Lasix daily.  Past medical history additionally notable for peripheral artery disease as well as cerebrovascular disease and diabetes as well as hypercholesterolemia and hypertension.    Review of patient's allergies indicates:   Allergen Reactions    Codeine      Past Medical History:   Diagnosis Date    Blood clot associated with vein wall inflammation     Diabetes mellitus     High cholesterol     Hypertension     NICM (nonischemic cardiomyopathy)     PAD (peripheral artery disease)     Stroke      Past Surgical History:   Procedure Laterality Date    APPENDECTOMY      CARDIAC CATHETERIZATION      CARDIAC DEFIBRILLATOR PLACEMENT       SECTION       History reviewed. No pertinent family history.  Social History     Tobacco Use    Smoking status: Never    Smokeless tobacco: Never   Substance Use Topics    Alcohol use: No    Drug use: No     Review of Systems   Constitutional:  Negative for chills and fever.   HENT:  Negative for congestion and rhinorrhea.    Respiratory:  Positive for shortness of breath. Negative for cough.    Cardiovascular:  Negative for chest pain and palpitations.   Gastrointestinal:  Negative for abdominal pain, diarrhea and vomiting.   Genitourinary:  Negative for difficulty urinating and dysuria.   Skin:  Negative for color change and rash.   Neurological:  Negative for dizziness and light-headedness.   Hematological:  Negative for adenopathy.  Does not bruise/bleed easily.     Physical Exam     Initial Vitals [02/09/23 0110]   BP Pulse Resp Temp SpO2   (!) 170/90 (!) 120 (!) 26 98.3 °F (36.8 °C) (!) 88 %      MAP       --         Vitals:    02/09/23 0110 02/09/23 0116 02/09/23 0132 02/09/23 0238   BP: (!) 170/90   (!) 179/85   Pulse: (!) 120 (!) 117  103   Resp: (!) 26   (!) 23   Temp: 98.3 °F (36.8 °C)      TempSrc: Oral      SpO2: (!) 88% (!) 92% 95% 99%   Weight: 135.2 kg (298 lb)        Physical Exam    Nursing note and vitals reviewed.  Constitutional: She appears well-developed and well-nourished. She is not diaphoretic. No distress.   HENT:   Head: Normocephalic and atraumatic.   Nose: Nose normal.   Mouth/Throat: Oropharynx is clear and moist.   Eyes: Conjunctivae are normal. No scleral icterus.   Cardiovascular:  Normal rate, regular rhythm, normal heart sounds and intact distal pulses.           Pulmonary/Chest: Accessory muscle usage present. Tachypnea noted. She is in respiratory distress. She has rales.   Abdominal: Abdomen is soft. She exhibits no distension. There is no abdominal tenderness.   Musculoskeletal:         General: No edema. Normal range of motion.     Neurological: She is alert and oriented to person, place, and time. She has normal strength.   Skin: Skin is warm and dry.       ED Course   Critical Care    Date/Time: 2/9/2023 3:56 AM  Performed by: Bonifacio Wakefield MD  Authorized by: Nick Sawyer MD   Total critical care time (exclusive of procedural time) : 40 minutes  Critical care time was exclusive of separately billable procedures and treating other patients.  Critical care was necessary to treat or prevent imminent or life-threatening deterioration of the following conditions: cardiac failure.  Critical care was time spent personally by me on the following activities: ordering and review of radiographic studies, ordering and review of laboratory studies, ordering and performing treatments and interventions,  evaluation of patient's response to treatment, examination of patient, re-evaluation of patient's condition, pulse oximetry, discussions with consultants, development of treatment plan with patient or surrogate and obtaining history from patient or surrogate.                       MDM  History Acquisition     The patient's list of active medical problems, social history, medications, and allergies as documented per RN staff has been reviewed.       Differential Diagnoses   Based on available information and the initial assessment, the working differential diagnoses considered during this evaluation include but are not limited to  CHF exacerbation, COPD exacerbation, pneumonia .      LABS     Labs Reviewed   CBC W/ AUTO DIFFERENTIAL - Abnormal; Notable for the following components:       Result Value    Hemoglobin 11.6 (*)     MCHC 31.0 (*)     RDW 14.6 (*)     Immature Granulocytes 0.6 (*)     Immature Grans (Abs) 0.05 (*)     All other components within normal limits   COMPREHENSIVE METABOLIC PANEL - Abnormal; Notable for the following components:    Glucose 152 (*)     BUN 20 (*)     Anion Gap 7 (*)     eGFR 49.2 (*)     All other components within normal limits   NT-PRO NATRIURETIC PEPTIDE - Abnormal; Notable for the following components:    NT-proBNP 1120 (*)     All other components within normal limits   PROTIME-INR - Abnormal; Notable for the following components:    Prothrombin Time 20.9 (*)     INR 2.0 (*)     All other components within normal limits   TROPONIN I     Notable findings from our independent review of the labs ordered include  unremarkable findings on CBC with suggestion of CKD on CMP with a GFR of 49.  BNP is elevated at 11 20 consistent with her presentation of heart failure.  INR is therapeutic at 2.0. .    Imaging     Imaging Results              X-Ray Chest AP Portable (In process)                   X-Rays:   Independently Interpreted Readings:   Chest X-Ray: Increased vascular markings  consistent with CHF are present.  Cardiomegaly present. AICD in place/intact     EKG   EKG Readings: (Independently Interpreted)   Initial Reading: No STEMI. Rhythm: Sinus Tachycardia. Heart Rate: 104. Ectopy: No Ectopy. Q Waves: V1 and V2.     Additional Consideration       Comorbidities taken into consideration for development of diagnosis and treatment plan include CHF, HTN, HLD, and PAD as well as CVD and NICM.    Medications   furosemide injection 40 mg (40 mg Intravenous Given 2/9/23 0156)            ED Course as of 02/09/23 0357   u Feb 09, 2023   0238 A request has been placed with the patient flow center and we are currently awaiting call back to discuss options for transfer.   [ST]   0252 Communication from the patient flow center indicates that [facility] is currently unable to accommodate the patient's secondary to capacity status.  We did discuss this with the patient who is agreeable to proceed with hospitalization in [place]. [ST]      ED Course User Index  [ST] Bonifacio Wakefield MD         ED Management/Discussion   All available findings were reviewed with the patient/family in detail along with the indications for hospitalization in order to receive CHF management with ongoing diuresis and BP control with respiratory support as needed.  All remaining questions and concerns were addressed at this time and the patient/family communicates understanding and agrees to proceed accordingly.  Similarly, all pertinent details of the encounter were discussed with Dr Sawyer who agrees to receive the patient at [facility] for further care as outlined above.  The patient will be transferred by EMS secondary to a need for ongoing cardiac monitoring and supplemental O2 en route.          CLINICAL IMPRESSION    ICD-10-CM ICD-9-CM   1. Acute diastolic CHF (congestive heart failure), NYHA class 3  I50.31 428.31   2. SOB (shortness of breath)  R06.02 786.05   3. Acute respiratory failure with hypoxia   J96.01 518.81          ED Disposition Condition    Observation Stable               Bonifacio Wakefield MD  02/09/23 0357

## 2023-02-09 NOTE — MEDICAL/APP STUDENT
[facility] Medicine Student   History and Physical  Networked reference to record PCT   02/09/2023  1:23 PM    SUBJECTIVE:     Chief Complaint:  Shortness of Breath    History of Present Illness:  Ms. Gama Oneil is a 52 y.o. female with a relevant medical history of T2DM, HLD, HTN, PAD, and CVA who presented to [facility] Emergency Department following an SOB 2/2 Acute exacerbation 2/2 CHF.     Around 10PM last night the pt reports that she got up to use the restroom and she had an episode of shortness of breath. She stated being unusually out of breath and laid down. When she could not feel comfortable she said she didn't feel right and had he friend bring her to the hospital. Pt denies ay fever or chills. Pt states she has a history of CVA and doesn't keep track of her blood glucose level as well as she should.     Review of Systems   Constitutional:  Positive for malaise/fatigue. Negative for chills, diaphoresis and fever.   HENT:  Negative for congestion and nosebleeds.    Eyes:  Negative for photophobia and pain.   Respiratory:  Positive for shortness of breath. Negative for cough, sputum production, wheezing and stridor.    Cardiovascular:  Positive for orthopnea and leg swelling. Negative for chest pain.   Gastrointestinal:  Negative for abdominal pain, constipation, heartburn and nausea.   Genitourinary:  Negative for dysuria and hematuria.   Musculoskeletal:  Positive for back pain. Negative for joint pain and myalgias.   Neurological:  Positive for dizziness and sensory change (In fingers and toes). Negative for seizures, weakness and headaches.   Psychiatric/Behavioral:  Negative for depression and substance abuse. The patient has insomnia (more difficulty going to sleep than usual).         HISTORY:     Past Medical History:   Diagnosis Date    Blood clot associated with vein wall inflammation     Diabetes mellitus     High cholesterol     Hypertension     NICM (nonischemic cardiomyopathy)     PAD  (peripheral artery disease)     Stroke        Past Surgical History:   Procedure Laterality Date    APPENDECTOMY      CARDIAC CATHETERIZATION      CARDIAC DEFIBRILLATOR PLACEMENT       SECTION         History reviewed. No pertinent family history.    Social History     Socioeconomic History    Marital status:    Tobacco Use    Smoking status: Never    Smokeless tobacco: Never   Substance and Sexual Activity    Alcohol use: No    Drug use: No       MEDICATIONS & ALLERGIES:     No current facility-administered medications on file prior to encounter.     Current Outpatient Medications on File Prior to Encounter   Medication Sig Dispense Refill    carvedilol (COREG) 25 MG tablet Take 25 mg by mouth 2 (two) times daily with meals. 1.5 pills twice daily      furosemide (LASIX) 80 MG tablet Take 80 mg by mouth once daily.      gabapentin (NEURONTIN) 100 MG capsule Take 100 mg by mouth 3 (three) times daily.      glipiZIDE (GLUCOTROL) 5 MG TR24 Take 5 mg by mouth after lunch.      insulin glargine (LANTUS) 100 unit/mL injection Inject into the skin every evening.      insulin lispro (HUMALOG) 100 unit/mL injection Inject into the skin 3 (three) times daily before meals.      LORazepam (ATIVAN) 1 MG tablet Take 1 tablet (1 mg total) by mouth 2 (two) times daily. As needed for muscle tightness 10 tablet 0    metformin (GLUCOPHAGE) 1000 MG tablet Take 1,000 mg by mouth every evening.      mometasone (NASONEX) 50 mcg/actuation nasal spray 2 sprays by Nasal route once daily.      rosuvastatin (CRESTOR) 20 MG tablet Take 40 mg by mouth once daily.      warfarin (COUMADIN) 5 MG tablet Take 5 mg by mouth once daily. Sun, Mon, Wed, Fri, Sat  On Tues and  i take 7.5 mg      [DISCONTINUED] lisinopril (PRINIVIL,ZESTRIL) 20 MG tablet Take 20 mg by mouth once daily.         Ozempic             Take 1mg by mouth weekly    Review of patient's allergies indicates:   Allergen Reactions    Codeine        OBJECTIVE:      Vital Signs Recent:  Temp: 97.2 °F (36.2 °C) (02/09/23 1125)  Pulse: 97 (02/09/23 1125)  Resp: 18 (02/09/23 1125)  BP: 131/74 (02/09/23 1125)  SpO2: 97 % (02/09/23 1125)  Oxygen Documentation:    Flow (L/min): 2           Device (Oxygen Therapy): room air  $ Is the patient on Low Flow Oxygen?: Yes      Vital Signs Range (Last 24H):  Temp:  [96.8 °F (36 °C)-98.3 °F (36.8 °C)]   Pulse:  []   Resp:  [18-26]   BP: (131-179)/(65-90)   SpO2:  [88 %-100 %]        Weight:  Body mass index is 49.59 kg/m².  Wt Readings from Last 3 Encounters:   02/09/23 135.2 kg (298 lb)   11/11/19 132 kg (291 lb 0.1 oz)   04/04/19 136.1 kg (300 lb)        Physical Exam  Constitutional:       General: She is not in acute distress.     Appearance: She is obese. She is not ill-appearing or diaphoretic.   HENT:      Nose: No congestion or rhinorrhea.   Cardiovascular:      Rate and Rhythm: Normal rate and regular rhythm.      Pulses: Normal pulses.      Heart sounds: Normal heart sounds.   Pulmonary:      Effort: Pulmonary effort is normal.      Breath sounds: Normal breath sounds.   Abdominal:      General: Bowel sounds are normal.   Musculoskeletal:         General: Signs of injury (Lower back pain) present. No swelling.      Cervical back: Normal range of motion and neck supple. No rigidity.      Right lower leg: Edema present.      Left lower leg: Edema present.   Neurological:      Mental Status: She is alert.      Sensory: Sensory deficit (Fingers and Toes) present.      Motor: No weakness.   Psychiatric:         Mood and Affect: Mood normal.         Behavior: Behavior normal.         Thought Content: Thought content normal.         Judgment: Judgment normal.        Sodium (mmol/L)   Date Value   02/09/2023 140   11/11/2019 139   04/04/2019 139     Potassium (mmol/L)   Date Value   02/09/2023 4.3   11/11/2019 4.8   04/04/2019 4.7     Chloride (mmol/L)   Date Value   02/09/2023 109   11/11/2019 108   04/04/2019 108     CO2  (mmol/L)   Date Value   02/09/2023 24   11/11/2019 23   04/04/2019 24     BUN ([facility]) (mg/dL)   Date Value   07/17/2015 20 (H)     BUN (mg/dL)   Date Value   02/09/2023 20 (H)   11/11/2019 31 (H)   04/04/2019 19 (H)     Creatinine (mg/dL)   Date Value   02/09/2023 1.30   11/11/2019 1.19   04/04/2019 1.09     Glucose (mg/dL)   Date Value   02/09/2023 152 (H)   11/11/2019 163 (H)   04/04/2019 170 (H)     Calcium (mg/dL)   Date Value   02/09/2023 9.1   11/11/2019 9.5   04/04/2019 9.2     Alkaline Phosphatase ([facility]) (IU/L)   Date Value   07/17/2015 67     Alkaline Phosphatase (U/L)   Date Value   02/09/2023 77   11/11/2019 55   04/04/2019 62     ALT (U/L)   Date Value   02/09/2023 28   11/11/2019 18   04/04/2019 20     AST ([facility]) (IU/L)   Date Value   07/17/2015 98 (H)     AST (U/L)   Date Value   02/09/2023 31   11/11/2019 28   04/04/2019 20     Albumin (g/dL)   Date Value   02/09/2023 4.3   11/11/2019 3.9   04/04/2019 3.9     Total Protein (g/dL)   Date Value   02/09/2023 7.8   11/11/2019 7.2   04/04/2019 7.1     Total Bilirubin (mg/dL)   Date Value   02/09/2023 0.4   11/11/2019 0.3   04/04/2019 0.2     INR (no units)   Date Value   02/09/2023 2.0 (H)   06/08/2022 1.6 (H)   02/18/2022 2.0 (H)       WBC (K/uL)   Date Value   02/09/2023 8.19   11/11/2019 7.79   04/04/2019 7.99     Hemoglobin (g/dL)   Date Value   02/09/2023 11.6 (L)   11/11/2019 10.3 (L)   04/04/2019 10.5 (L)     Platelets (K/uL)   Date Value   02/09/2023 277   11/11/2019 248   04/04/2019 253       Diagnostic Results:  EKG shows 2nd Degree AV heart block (Mobitz type II), notes for septal infarct, tachycardic  CXR Diffuse pulmonary infiltrates consistent with a picture of pulmonary edema.       ASSESSMENT -- PLAN:   Ms. Gama Oneil is a 52 y.o. female with a relevant medical history of T2DM, HLD, HTN, PAD, and CVA who presented to [facility] Emergency Department following an SOB 2/2 Acute exacerbation 2/2 CHF.       Active  Hospital Problems    Diagnosis  POA    *Acute exacerbation of CHF (congestive heart failure) [I50.9]  Yes    Hyperlipidemia [E78.5]  Yes    Hypertension [I10]  Yes    Severe obesity (BMI >= 40) [E66.01]  Yes    PAD (peripheral artery disease) [I73.9]  Yes    Type 2 diabetes mellitus, with long-term current use of insulin [E11.9, Z79.4]  Not Applicable    Nonischemic cardiomyopathy [I42.8]  Yes    Cerebral arteriosclerosis with history of previous stroke [I67.2, Z86.73]  Not Applicable      Resolved Hospital Problems   No resolved problems to display.      Acute Excerbation of heart failrue  -pt came in for a SOB, CXR consistent with a picture of pulmonary edema likely 2/2 CHF. TTE shows LVEF ~30%    + Start 40mg IV Lasix  + Coreg  + Strict I/O's   + Monitor electrolytes K+, Mg+  + Monitor Renal Function daily CRT and eGFR levels  + Nitroglycerin PRN for acute decompensation    T2DM  -     +Hold home medications   + Start Sliding Scale  + SC injection Pre prandially  + HBA1C to assess glucose management    PAD  +Continue statin  +Continue Entresto  +Continue Ciprofloxacin for Foot infection 2/2 PAD    HTN  -BP was very high on admission but today WNL.     +Continue to monitor vitals  +Continue Entresto, Aldactone, Coreg    VTE Risk Mitigation (From admission, onward)           Ordered     warfarin (COUMADIN) tablet 5 mg  Every Saturday 02/09/23 1423     warfarin (COUMADIN) tablet 5 mg  Every Mon, Wed, Fri, Sun         02/09/23 1423     IP VTE HIGH RISK PATIENT  Once         02/09/23 1127     Place sequential compression device  Until discontinued         02/09/23 1127

## 2023-02-09 NOTE — ASSESSMENT & PLAN NOTE
Body mass index is 49.59 kg/m². Morbid obesity complicates all aspects of disease management from diagnostic modalities to treatment. Weight loss encouraged and health benefits explained to patient.

## 2023-02-09 NOTE — CONSULTS
[facility] - Telemetry  Wound Care    Patient Name:  Gama Oneil   MRN:  [id number]  Date: 2/9/2023  Diagnosis: Acute exacerbation of CHF (congestive heart failure)    History:     Past Medical History:   Diagnosis Date    Blood clot associated with vein wall inflammation     Diabetes mellitus     High cholesterol     Hypertension     NICM (nonischemic cardiomyopathy)     PAD (peripheral artery disease)     Stroke        Social History     Socioeconomic History    Marital status:    Tobacco Use    Smoking status: Never    Smokeless tobacco: Never   Substance and Sexual Activity    Alcohol use: No    Drug use: No       Precautions:     Allergies as of 02/09/2023 - Reviewed 02/09/2023   Allergen Reaction Noted    Codeine  07/17/2015       WOC Assessment Details/Treatment       L 4th toe- 100% eschar        L heel- intact with no redness        R heel- intact with no redness    Nurse reports sacrum/buttocks intact with no redness    Recommendations discussed pt, nurse and  Innocent:  - Nursing to maintain pressure injury prevention interventions to include waffle overlay  - Betadine to L 4th toe BID and leave open to air    02/09/2023

## 2023-02-09 NOTE — TELEPHONE ENCOUNTER
----- Message from Etta Fam, Patient Care Assistant sent at 2/9/2023 11:58 AM CST -----  Type:  Needs Medical Advice    Who Called:  Etta with [facility]  Would the patient rather a call back or a response via [employer]?  Call   Best Call Back Number:  [phone]  Additional Information:  Hospital consult in room 432/ Ordered by Megha Cross MD/ Reason for consult- CHF exa

## 2023-02-09 NOTE — HPI
Gama Oneil is a 51 y/o F with PMH of obesity, DM II , hyperlipidemia, HTN, PAD, CHF, ROMEL, nonischemic cardiomyopathy, history of previous stroke presents to [facility] ED weight few hours history of shortness of breath that started last night at 10 p.m. when patient got up to use the bathroom.  There is associated cough, lower extremity swelling.  She denies chest pain, chills, fever chills diaphoresis, palpitation, nausea or vomiting.  Patient reported her Lasix was decreased from 40 mg to 20 mg few months ago and she only had turkey sandwich for dinner.   In the ED oxygen saturation 88 percent on room air, /90, pulse 120, RR 26 CXR with concerns for pulmonary edema, pro BNP 1120, INR 2,.  Started on supplemental oxygen and IV Lasix.  Admit hospital medicine for treatment of CHF exacerbation

## 2023-02-09 NOTE — PROGRESS NOTES
[facility]                          Pharmacy   New Medication Education    Patient and/or Caregiver ACCEPTED medication education.    Pharmacy has provided education on the name, indication, and possible side effects of the medication(s) prescribed, using teach-back method.     Learners of pharmacy medication education includes:  patient    The following medications have also been discussed, during this admission.     Current Facility-Administered Medications   Medication Frequency    ciprofloxacin HCl tablet 500 mg Q12H    dextrose 10% bolus 125 mL 125 mL PRN    dextrose 10% bolus 250 mL 250 mL PRN    furosemide injection 40 mg Q12H    glucagon (human recombinant) injection 1 mg PRN    glucose chewable tablet 16 g PRN    glucose chewable tablet 24 g PRN    insulin aspart U-100 pen 0-5 Units QID (AC + HS) PRN    insulin detemir U-100 pen 10 Units BID    metoprolol succinate (TOPROL-XL) 24 hr tablet 25 mg Daily    sacubitriL-valsartan  mg per tablet 1 tablet BID    sodium chloride 0.9% flush 10 mL PRN    spironolactone tablet 25 mg Daily    [START ON 2/10/2023] warfarin (COUMADIN) tablet 5 mg Every Mon, Wed, Fri, Sun    [START ON 2/11/2023] warfarin (COUMADIN) tablet 5 mg Every Sat        Thank you  Artem Moise, MohanD

## 2023-02-09 NOTE — HOSPITAL COURSE
02/09/2023 Per HPI   02/10/2023 TTE  The left ventricle is moderately enlarged with eccentric hypertrophy and severely decreased systolic function.  The estimated ejection fraction is 25%.  Left ventricular diastolic dysfunction.  Normal right ventricular size with normal right ventricular systolic function.  Moderate left atrial enlargement.  Patient feeling back to baseline. Lasix converted to p.o. 40 mg daily with instruction for additional dose for weight gain. Hemodynamically stable overnight.

## 2023-02-09 NOTE — PLAN OF CARE
SW met with pt at bedside to complete assessment. Pt is AxO x3  and able to verbally answer assessment questions.  Pt able to confirm demographic information. Pt confirmed PCP but stated Dr. ZULLY Marquez retired and now being seen  by Dr. Ford. Pt reports to live at home with her  and feeling safe. Pt reports while at home being mostly independent of ADL's, however needing clothing assistance at times. Pt reports DME in use to be a CPAP. Pt reports ability to drive. Pt reports at time of discharge to have family to transport home. Pt reports to be active with wound care at  1/wk and drive herself . SW updated whiteboard with [facility] name and contact information. SW confirmed pt understanding of Observation unit and expected discharge plan. SW will continue to follow pt throughout care and assist with any discharge needs.         02/09/23 9902   Discharge Planning   Assessment Type Discharge Planning Brief Assessment   Resource/Environmental Concerns none   Support Systems Spouse/significant other;Parent;Family members   Equipment Currently Used at Home none;CPAP   Current Living Arrangements home   Patient/Family Anticipates Transition to home with family   Patient/Family Anticipated Services at Transition none   DME Needed Upon Discharge  none   Discharge Plan A Home with family       Future Appointments   Date Time Provider Department Center   2/22/2023  9:00 AM Poonam Ellis MD [facility]  [phone]

## 2023-02-09 NOTE — ASSESSMENT & PLAN NOTE
LVEF reported ~ 30% - records unavailable   Repeat TTE pending   NICM per patient   S/p ICD- no discharges reported  ADHF on exam  Continue IV Lasix, BB, Entresto, Aldactone

## 2023-02-09 NOTE — CONSULTS
[place] - Telemetry  Cardiology  Consult Note    Patient Name: Gama Oneil  MRN: [id number]  Admission Date: 2023  Hospital Length of Stay: 0 days  Code Status: Full Code   Attending Provider: Nick Sawyer MD   Consulting Provider: Moo Santiago NP  Primary Care Physician: Cisco Marquez MD  Principal Problem:Acute exacerbation of CHF (congestive heart failure)    Patient information was obtained from patient and ER records.     Inpatient consult to [facility]  Consult performed by: Moo Santiago NP  Consult ordered by: Megha Cross MD        Subjective:     Chief Complaint:  SOB     HPI:   Gama Oneil is a 52 year old female with NICM, obesity, PAD with left toe wound, DM, AICD, HLD, PAF, CVA . Patient is followed by  Cardiology, wound care, and vascular surgery. Patient presented to the ED with a 1 week history of worsening SOB. She reports compliance with medication and the only recent medication change was the addition of cipro for her toe wound. She believes that her last known LVEF was ~ 30%. Patient reports that she ate a turkey sandwich PTA which is out of the ordinary for her. She is usually compliant with her low sodium diet. Patient denies chest pain, palpitations, NV, worsening edema, diaphoresis. EKG ST without acute ischemic changes. Troponin negative. TTE is pending. INR 2. Patient diuresing with IV Lasix and is feeling better.       Past Medical History:   Diagnosis Date    Blood clot associated with vein wall inflammation     Diabetes mellitus     High cholesterol     Hypertension     NICM (nonischemic cardiomyopathy)     PAD (peripheral artery disease)     Stroke        Past Surgical History:   Procedure Laterality Date    APPENDECTOMY      CARDIAC CATHETERIZATION      CARDIAC DEFIBRILLATOR PLACEMENT       SECTION         Review of patient's allergies indicates:   Allergen Reactions    Codeine        No current facility-administered  medications on file prior to encounter.     Current Outpatient Medications on File Prior to Encounter   Medication Sig    carvedilol (COREG) 25 MG tablet Take 25 mg by mouth 2 (two) times daily with meals. 1.5 pills twice daily    furosemide (LASIX) 80 MG tablet Take 80 mg by mouth once daily.    gabapentin (NEURONTIN) 100 MG capsule Take 100 mg by mouth 3 (three) times daily.    glipiZIDE (GLUCOTROL) 5 MG TR24 Take 5 mg by mouth after lunch.    insulin glargine (LANTUS) 100 unit/mL injection Inject into the skin every evening.    insulin lispro (HUMALOG) 100 unit/mL injection Inject into the skin 3 (three) times daily before meals.    LORazepam (ATIVAN) 1 MG tablet Take 1 tablet (1 mg total) by mouth 2 (two) times daily. As needed for muscle tightness    metformin (GLUCOPHAGE) 1000 MG tablet Take 1,000 mg by mouth every evening.    mometasone (NASONEX) 50 mcg/actuation nasal spray 2 sprays by Nasal route once daily.    rosuvastatin (CRESTOR) 20 MG tablet Take 40 mg by mouth once daily.    warfarin (COUMADIN) 5 MG tablet Take 5 mg by mouth once daily. Sun, Mon, Wed, Fri, Sat  On Tues and Thurday i take 7.5 mg    [DISCONTINUED] lisinopril (PRINIVIL,ZESTRIL) 20 MG tablet Take 20 mg by mouth once daily.     Family History    None       Tobacco Use    Smoking status: Never    Smokeless tobacco: Never   Substance and Sexual Activity    Alcohol use: No    Drug use: No    Sexual activity: Not on file     Review of Systems   Constitutional: Negative. Negative for diaphoresis.   HENT: Negative.     Eyes: Negative.    Cardiovascular:  Positive for dyspnea on exertion and leg swelling. Negative for chest pain, near-syncope, orthopnea, palpitations, paroxysmal nocturnal dyspnea and syncope.   Respiratory:  Positive for shortness of breath.    Endocrine: Negative.    Hematologic/Lymphatic: Negative.    Skin:  Positive for poor wound healing.   Gastrointestinal:  Negative for abdominal pain, nausea and vomiting.    Genitourinary: Negative.    Neurological: Negative.    Psychiatric/Behavioral: Negative.     Objective:     Vital Signs (Most Recent):  Temp: 97.2 °F (36.2 °C) (02/09/23 1125)  Pulse: 97 (02/09/23 1125)  Resp: 18 (02/09/23 1125)  BP: 131/74 (02/09/23 1125)  SpO2: 97 % (02/09/23 1125) Vital Signs (24h Range):  Temp:  [96.8 °F (36 °C)-98.3 °F (36.8 °C)] 97.2 °F (36.2 °C)  Pulse:  [] 97  Resp:  [18-26] 18  SpO2:  [88 %-100 %] 97 %  BP: (131-179)/(65-90) 131/74     Weight: 135.2 kg (298 lb)  Body mass index is 49.59 kg/m².    SpO2: 97 %         Intake/Output Summary (Last 24 hours) at 2/9/2023 1220  Last data filed at 2/9/2023 0430  Gross per 24 hour   Intake --   Output 950 ml   Net -950 ml       Lines/Drains/Airways       Peripheral Intravenous Line  Duration                  Peripheral IV - Single Lumen 02/09/23 0147 20 G Right Antecubital <1 day                    Physical Exam  Constitutional:       General: She is not in acute distress.     Appearance: She is obese. She is not diaphoretic.   HENT:      Head: Atraumatic.   Eyes:      General:         Right eye: No discharge.         Left eye: No discharge.   Cardiovascular:      Rate and Rhythm: Normal rate and regular rhythm.   Pulmonary:      Breath sounds: Rales present.   Abdominal:      General: Bowel sounds are normal.      Palpations: Abdomen is soft.   Musculoskeletal:      Right lower leg: Edema present.      Left lower leg: Edema present.   Skin:     General: Skin is warm and dry.   Neurological:      Mental Status: She is alert and oriented to person, place, and time.   Psychiatric:         Mood and Affect: Mood normal.         Behavior: Behavior normal.         Thought Content: Thought content normal.         Judgment: Judgment normal.       Significant Labs: BMP:   Recent Labs   Lab 02/09/23  0153   *      K 4.3      CO2 24   BUN 20*   CREATININE 1.30   CALCIUM 9.1   , CMP   Recent Labs   Lab 02/09/23  0153      K 4.3       CO2 24   *   BUN 20*   CREATININE 1.30   CALCIUM 9.1   PROT 7.8   ALBUMIN 4.3   BILITOT 0.4   ALKPHOS 77   AST 31   ALT 28   ANIONGAP 7*   , CBC   Recent Labs   Lab 02/09/23 0153   WBC 8.19   HGB 11.6*   HCT 37.4      , INR   Recent Labs   Lab 02/09/23 0153   INR 2.0*   , Lipid Panel No results for input(s): CHOL, HDL, LDLCALC, TRIG, CHOLHDL in the last 48 hours., Troponin   Recent Labs   Lab 02/09/23 0153   TROPONINI <0.012   , and All pertinent lab results from the last 24 hours have been reviewed.    Significant Imaging: Echocardiogram: Transthoracic echo (TTE) complete (Cupid Only): No results found for this or any previous visit.    Assessment and Plan:     Cerebral arteriosclerosis with history of previous stroke  Continue Coumadin and statin     Nonischemic cardiomyopathy  LVEF reported ~ 30% - records unavailable   Repeat TTE pending   NICM per patient   S/p ICD- no discharges reported  ADHF on exam  Continue IV Lasix, BB, Entresto, Aldactone      Type 2 diabetes mellitus, with long-term current use of insulin  HA1c pending  AccuChecks AC/HS SSI     PAD (peripheral artery disease)  Followed by vascular surgery and WOC at [facility] with plans for US and angiogram soon  Medical management for now with asa, Statin, ARB  Continue Cipro        Severe obesity (BMI >= 40)  Weight loss encouraged     Hypertension  SBP 130s-180s, 190 on admission   Resume home Entresto, Aldactone, BB         VTE Risk Mitigation (From admission, onward)         Ordered     IP VTE HIGH RISK PATIENT  Once         02/09/23 1127     Place sequential compression device  Until discontinued         02/09/23 1127                Thank you for your consult. I will follow-up with patient. Please contact us if you have any additional questions.    Moo Santiago NP  Cardiology   [place] - Telemetry

## 2023-02-09 NOTE — ASSESSMENT & PLAN NOTE
PAD (peripheral artery disease)  Nonischemic cardiomyopathy    Patient is identified as having Combined Systolic and Diastolic heart failure that is Acute on chronic. CHF is currently uncontrolled due to Continued edema of extremities. Latest ECHO performed and demonstrates- No results found for this or any previous visit.  . Continue Furosemide and Aldactone and monitor clinical status closely. Monitor on telemetry. Patient is on CHF pathway.  Monitor strict Is&Os and daily weights.  Place on fluid restriction of 1.5 L. Continue to stress to patient importance of self efficacy and  on diet for CHF. Last BNP reviewed- and noted below No results for input(s): BNP, BNPTRIAGEBLO in the last 168 hours.   CXR- Limited study due to body habitus.  Suspect diffuse pulmonary edema.  EKG without acute ischemic changes, reports 2nd degree A-V block (Mobitz II)   IV Lasix  Resume home Metoprolol, Entresto, spironolactone  Supplemental oxygen  S/p ICD  TTE   Consult cardiology    continue Gabapentin

## 2023-02-09 NOTE — PROGRESS NOTES
[facility]                   Pharmacy  Pharmacy Warfarin Education Note       Gama Oneil was offered Warfarin Education on 02/09/2023.  The patient and/or family/caregiver was present and accepted education, using teachback method. .    Patient specific concerns or situations: n/a    Warfarin education materials provided and information discussed during the education process included:  1. What warfarin is  2. Indication, current dose,how to take warfarin, what time of day to take warfarin,      Follow-up appointment for monitoring  3. What to do if you miss a dose  4. Drug interactions associated with the use of anticoagulants (I.e. Warfarin), such as        the use of over the counter medications (e.g. Aspirin, acetaminophen, ibuprofen),        prescription drugs, and herbal supplements. Notify prescribing doctor of any        changes in medications.  5.  Side effects of taking warfarin, increased bleeding risk, when to call the prescribing       physician, when to seek emergency help  6.  Monitoring blood levels (PT/INR)  7.  Eating habits and being consistent, Vitamin K rich foods, effects of diet on blood       Levels  8.  Recommendation of purchasing a medical bracelet or carrying a ID card to alert medical staff if you become ill  9.  Notifying physicians of procedures, medical or dental      Anticoagulants       Ordered     Route Frequency Start Stop    02/09/23 1627  Coumadin         Oral Daily 02/09/23 1730 --          Lab Results   Component Value Date/Time    INR 2.0 (H) 02/09/2023 01:53 AM    INR 1.6 (H) 06/08/2022 02:49 PM    INR 2.0 (H) 02/18/2022 09:45 AM   ;  Lab Results   Component Value Date/Time    HGB 11.6 (L) 02/09/2023 01:53 AM    HGB 10.3 (L) 11/11/2019 08:11 AM    HGB 10.5 (L) 04/04/2019 08:09 AM

## 2023-02-09 NOTE — H&P
[facility] Medicine  History & Physical    Patient Name: Gama Oneil  MRN: [id number]  Patient Class: IP- Inpatient  Admission Date: 2/9/2023  Attending Physician: Megha Cross MD  Primary Care Provider: Cisco Marquez MD         Patient information was obtained from patient and ER records.     Subjective:     Principal Problem:Acute exacerbation of CHF (congestive heart failure)    Chief Complaint:   Chief Complaint   Patient presents with    Shortness of Breath     Reports to ED c c/o SOB since 2200 last night. States shortwinded walking to bed. +Cough. Hx of diabetes and stroke        HPI: Gama Oneil is a 53 y/o F with PMH of obesity, DM II , hyperlipidemia, HTN, PAD, CHF, ROMEL, nonischemic cardiomyopathy, history of previous stroke presents to [facility] ED weight few hours history of shortness of breath that started last night at 10 p.m. when patient got up to use the bathroom.  There is associated cough, lower extremity swelling.  She denies chest pain, chills, fever chills diaphoresis, palpitation, nausea or vomiting.  Patient reported her Lasix was decreased from 40 mg to 20 mg few months ago and she only had turkey sandwich for dinner.   In the ED oxygen saturation 88 percent on room air, /90, pulse 120, RR 26 CXR with concerns for pulmonary edema, pro BNP 1120, INR 2,.  Started on supplemental oxygen and IV Lasix.  Admit hospital medicine for treatment of CHF exacerbation      Past Medical History:   Diagnosis Date    Blood clot associated with vein wall inflammation     CHF (congestive heart failure)     Diabetes mellitus     High cholesterol     Hypertension     Mixed hyperlipidemia     NICM (nonischemic cardiomyopathy)     ROMEL (obstructive sleep apnea)     PAD (peripheral artery disease)     PAD (peripheral artery disease)     Stroke        Past Surgical History:   Procedure Laterality Date    APPENDECTOMY      CARDIAC CATHETERIZATION      CARDIAC DEFIBRILLATOR  PLACEMENT       SECTION         Review of patient's allergies indicates:   Allergen Reactions    Codeine        No current facility-administered medications on file prior to encounter.     Current Outpatient Medications on File Prior to Encounter   Medication Sig    carvedilol (COREG) 25 MG tablet Take 25 mg by mouth 2 (two) times daily with meals. 1.5 pills twice daily    furosemide (LASIX) 80 MG tablet Take 80 mg by mouth once daily.    gabapentin (NEURONTIN) 100 MG capsule Take 100 mg by mouth 3 (three) times daily.    glipiZIDE (GLUCOTROL) 5 MG TR24 Take 5 mg by mouth after lunch.    insulin glargine (LANTUS) 100 unit/mL injection Inject into the skin every evening.    insulin lispro (HUMALOG) 100 unit/mL injection Inject into the skin 3 (three) times daily before meals.    lisinopril (PRINIVIL,ZESTRIL) 20 MG tablet Take 20 mg by mouth once daily.    LORazepam (ATIVAN) 1 MG tablet Take 1 tablet (1 mg total) by mouth 2 (two) times daily. As needed for muscle tightness    metformin (GLUCOPHAGE) 1000 MG tablet Take 1,000 mg by mouth every evening.    mometasone (NASONEX) 50 mcg/actuation nasal spray 2 sprays by Nasal route once daily.    rosuvastatin (CRESTOR) 20 MG tablet Take 40 mg by mouth once daily.    warfarin (COUMADIN) 5 MG tablet Take 5 mg by mouth once daily. Sun, Mon, Wed, Fri, Sat  On Tues and  i take 7.5 mg     Family History    None       Tobacco Use    Smoking status: Never    Smokeless tobacco: Never   Substance and Sexual Activity    Alcohol use: No    Drug use: No    Sexual activity: Not on file     Review of Systems   Constitutional:  Positive for activity change, appetite change and fatigue. Negative for diaphoresis and fever.   HENT:  Negative for congestion.    Eyes:  Negative for photophobia and visual disturbance.   Respiratory:  Positive for cough and shortness of breath. Negative for chest tightness.    Cardiovascular:  Positive for leg swelling. Negative for palpitations.    Endocrine: Negative for cold intolerance and heat intolerance.   Genitourinary:  Negative for dysuria.   Musculoskeletal:  Negative for arthralgias and neck stiffness.   Skin:  Negative for color change and wound.   Neurological:  Positive for weakness.   Psychiatric/Behavioral:  Negative for agitation.    Objective:     Vital Signs (Most Recent):  Temp: 96.8 °F (36 °C) (02/09/23 0845)  Pulse: 95 (02/09/23 0845)  Resp: 18 (02/09/23 0845)  BP: 137/74 (02/09/23 0845)  SpO2: 100 % (02/09/23 0845) Vital Signs (24h Range):  Temp:  [96.8 °F (36 °C)-98.3 °F (36.8 °C)] 96.8 °F (36 °C)  Pulse:  [] 95  Resp:  [18-26] 18  SpO2:  [88 %-100 %] 100 %  BP: (137-179)/(65-90) 137/74     Weight: 135.2 kg (298 lb)  Body mass index is 49.59 kg/m².    Physical Exam  Constitutional:       Appearance: She is obese. She is not diaphoretic.   HENT:      Head: Normocephalic and atraumatic.      Nose: Nose normal.   Eyes:      Pupils: Pupils are equal, round, and reactive to light.   Cardiovascular:      Rate and Rhythm: Normal rate.   Pulmonary:      Effort: Pulmonary effort is normal.   Abdominal:      General: Bowel sounds are normal.      Palpations: Abdomen is soft.      Comments: obese   Musculoskeletal:      Cervical back: Normal range of motion.      Right lower leg: Edema present.      Left lower leg: Edema present.   Skin:     General: Skin is warm.      Comments: Left fifth digit dressing in place   Neurological:      General: No focal deficit present.      Mental Status: She is alert and oriented to person, place, and time. Mental status is at baseline.   Psychiatric:         Mood and Affect: Mood normal.         Behavior: Behavior normal.         CRANIAL NERVES     CN III, IV, VI   Pupils are equal, round, and reactive to light.     Significant Labs: A1C: No results for input(s): HGBA1C in the last 4320 hours.  ABGs: No results for input(s): PH, PCO2, HCO3, POCSATURATED, BE, TOTALHB, COHB, METHB, O2HB, POCFIO2, PO2 in the  last 48 hours.  Blood Culture: No results for input(s): LABBLOO in the last 48 hours.  CBC:   Recent Labs   Lab 02/09/23 0153   WBC 8.19   HGB 11.6*   HCT 37.4        CMP:   Recent Labs   Lab 02/09/23 0153      K 4.3      CO2 24   *   BUN 20*   CREATININE 1.30   CALCIUM 9.1   PROT 7.8   ALBUMIN 4.3   BILITOT 0.4   ALKPHOS 77   AST 31   ALT 28   ANIONGAP 7*     Lactic Acid: No results for input(s): LACTATE in the last 48 hours.  Lipase: No results for input(s): LIPASE in the last 48 hours.  Lipid Panel: No results for input(s): CHOL, HDL, LDLCALC, TRIG, CHOLHDL in the last 48 hours.  Magnesium: No results for input(s): MG in the last 48 hours.  Troponin:   Recent Labs   Lab 02/09/23 0153   TROPONINI <0.012     TSH: No results for input(s): TSH in the last 4320 hours.  Urine Culture: No results for input(s): LABURIN in the last 48 hours.  Urine Studies: No results for input(s): COLORU, APPEARANCEUA, PHUR, SPECGRAV, PROTEINUA, GLUCUA, KETONESU, BILIRUBINUA, OCCULTUA, NITRITE, UROBILINOGEN, LEUKOCYTESUR, RBCUA, WBCUA, BACTERIA, SQUAMEPITHEL, HYALINECASTS in the last 48 hours.    Invalid input(s): WRIGHTSUR    Significant Imaging: I have reviewed all pertinent imaging results/findings within the past 24 hours.    Assessment/Plan:     * Acute exacerbation of CHF (congestive heart failure)  PAD (peripheral artery disease)  Nonischemic cardiomyopathy    Patient is identified as having Combined Systolic and Diastolic heart failure that is Acute on chronic. CHF is currently uncontrolled due to Continued edema of extremities. Latest ECHO performed and demonstrates- No results found for this or any previous visit.  . Continue Furosemide and Aldactone and monitor clinical status closely. Monitor on telemetry. Patient is on CHF pathway.  Monitor strict Is&Os and daily weights.  Place on fluid restriction of 1.5 L. Continue to stress to patient importance of self efficacy and  on diet for CHF.  Last BNP reviewed- and noted below No results for input(s): BNP, BNPTRIAGEBLO in the last 168 hours.   CXR- Limited study due to body habitus.  Suspect diffuse pulmonary edema.  EKG without acute ischemic changes, reports 2nd degree A-V block (Mobitz II)   IV Lasix  Resume home Metoprolol, Entresto, spironolactone  Supplemental oxygen  S/p ICD  TTE   Consult cardiology    continue Gabapentin        left second toen open toe wound  Resume home Cipro  Consult wound care      ROMEL (obstructive sleep apnea)    CPAP qhs    Cerebral arteriosclerosis with history of previous stroke  Continue statin and Coumadin  INR is 2- continue to monitor      Type 2 diabetes mellitus, with long-term current use of insulin  Diabetic neuropathy  Patient's FSGs are uncontrolled due to hyperglycemia on current medication regimen.  Last A1c reviewed- No results found for: LABA1C, HGBA1C  Most recent fingerstick glucose reviewed-   Recent Labs   Lab 02/09/23  1402   POCTGLUCOSE 177*     Current correctional scale  Low  Maintain anti-hyperglycemic dose as follows-   Antihyperglycemics (From admission, onward)      Start     Stop Route Frequency Ordered    02/09/23 2100  insulin detemir U-100 pen 10 Units         -- SubQ 2 times daily 02/09/23 1349    02/09/23 1449  insulin aspart U-100 pen 0-5 Units         -- SubQ Before meals & nightly PRN 02/09/23 1349          Hold Oral hypoglycemics while patient is in the hospital.  On Ozempic next dose due 2/10    PAD (peripheral artery disease)  Followed by vascular at [facility]  Continue Warfarin and statin      Severe obesity (BMI >= 40)  Body mass index is 49.59 kg/m². Morbid obesity complicates all aspects of disease management from diagnostic modalities to treatment. Weight loss encouraged and health benefits explained to patient.         Hypertension  Continue spironolactone, Lasix, Entresto,    Hyperlipidemia  On home Crestor- will use Lipitor while the patient        VTE Risk Mitigation (From  admission, onward)           Ordered     warfarin (COUMADIN) tablet 5 mg  Every Saturday 02/09/23 1423     warfarin (COUMADIN) tablet 5 mg  Every Mon, Wed, Fri, Sun         02/09/23 1423     IP VTE HIGH RISK PATIENT  Once         02/09/23 1127     Place sequential compression device  Until discontinued         02/09/23 1127                       Meghaemily Cross MD  Department of Hospital Medicine   [place] [facility]

## 2023-02-10 VITALS
DIASTOLIC BLOOD PRESSURE: 58 MMHG | OXYGEN SATURATION: 95 % | SYSTOLIC BLOOD PRESSURE: 92 MMHG | RESPIRATION RATE: 16 BRPM | TEMPERATURE: 97 F | BODY MASS INDEX: 48.82 KG/M2 | WEIGHT: 293 LBS | HEIGHT: 65 IN | HEART RATE: 101 BPM

## 2023-02-10 LAB
ANION GAP SERPL CALC-SCNC: 13 MMOL/L (ref 8–16)
BUN SERPL-MCNC: 22 MG/DL (ref 6–20)
CALCIUM SERPL-MCNC: 9.3 MG/DL (ref 8.7–10.5)
CHLORIDE SERPL-SCNC: 107 MMOL/L (ref 95–110)
CO2 SERPL-SCNC: 19 MMOL/L (ref 23–29)
CREAT SERPL-MCNC: 1.4 MG/DL (ref 0.5–1.4)
EST. GFR  (NO RACE VARIABLE): 45 ML/MIN/1.73 M^2
ESTIMATED AVG GLUCOSE: 186 MG/DL (ref 68–131)
GLUCOSE SERPL-MCNC: 187 MG/DL (ref 70–110)
HBA1C MFR BLD: 8.1 % (ref 4–5.6)
INR PPP: 2 (ref 0.8–1.2)
POCT GLUCOSE: 182 MG/DL (ref 70–110)
POCT GLUCOSE: 190 MG/DL (ref 70–110)
POCT GLUCOSE: 227 MG/DL (ref 70–110)
POTASSIUM SERPL-SCNC: 3.8 MMOL/L (ref 3.5–5.1)
PROTHROMBIN TIME: 19.6 SEC (ref 9–12.5)
SODIUM SERPL-SCNC: 139 MMOL/L (ref 136–145)

## 2023-02-10 PROCEDURE — 36415 COLL VENOUS BLD VENIPUNCTURE: CPT | Performed by: FAMILY MEDICINE

## 2023-02-10 PROCEDURE — 80048 BASIC METABOLIC PNL TOTAL CA: CPT | Performed by: FAMILY MEDICINE

## 2023-02-10 PROCEDURE — 25000003 PHARM REV CODE 250: Performed by: NURSE PRACTITIONER

## 2023-02-10 PROCEDURE — 99233 SBSQ HOSP IP/OBS HIGH 50: CPT | Mod: ,,, | Performed by: NURSE PRACTITIONER

## 2023-02-10 PROCEDURE — 94660 CPAP INITIATION&MGMT: CPT

## 2023-02-10 PROCEDURE — 99233 PR SUBSEQUENT HOSPITAL CARE,LEVL III: ICD-10-PCS | Mod: ,,, | Performed by: NURSE PRACTITIONER

## 2023-02-10 PROCEDURE — 27000221 HC OXYGEN, UP TO 24 HOURS

## 2023-02-10 PROCEDURE — 25000003 PHARM REV CODE 250: Performed by: FAMILY MEDICINE

## 2023-02-10 PROCEDURE — 63600175 PHARM REV CODE 636 W HCPCS: Performed by: FAMILY MEDICINE

## 2023-02-10 PROCEDURE — 85610 PROTHROMBIN TIME: CPT | Performed by: FAMILY MEDICINE

## 2023-02-10 PROCEDURE — 99900035 HC TECH TIME PER 15 MIN (STAT)

## 2023-02-10 RX ORDER — ACETAMINOPHEN 325 MG/1
650 TABLET ORAL EVERY 6 HOURS PRN
Status: DISCONTINUED | OUTPATIENT
Start: 2023-02-10 | End: 2023-02-10 | Stop reason: HOSPADM

## 2023-02-10 RX ORDER — WARFARIN SODIUM 5 MG/1
5 TABLET ORAL DAILY
Qty: 30 TABLET | Refills: 11 | Status: SHIPPED | OUTPATIENT
Start: 2023-02-10 | End: 2024-02-10

## 2023-02-10 RX ORDER — CIPROFLOXACIN 500 MG/1
500 TABLET ORAL EVERY 12 HOURS
Qty: 16 TABLET | Refills: 0 | Status: SHIPPED | OUTPATIENT
Start: 2023-02-10 | End: 2023-02-18

## 2023-02-10 RX ORDER — FUROSEMIDE 40 MG/1
40 TABLET ORAL DAILY
Qty: 30 TABLET | Refills: 11 | Status: SHIPPED | OUTPATIENT
Start: 2023-02-10 | End: 2024-02-10

## 2023-02-10 RX ORDER — METOPROLOL SUCCINATE 25 MG/1
25 TABLET, EXTENDED RELEASE ORAL DAILY
Qty: 30 TABLET | Refills: 11 | Status: SHIPPED | OUTPATIENT
Start: 2023-02-11 | End: 2024-02-11

## 2023-02-10 RX ORDER — WARFARIN SODIUM 5 MG/1
5 TABLET ORAL DAILY
Qty: 30 TABLET | Refills: 11 | Status: SHIPPED | OUTPATIENT
Start: 2023-02-10 | End: 2023-02-10 | Stop reason: HOSPADM

## 2023-02-10 RX ORDER — METFORMIN HYDROCHLORIDE 1000 MG/1
1000 TABLET ORAL 2 TIMES DAILY WITH MEALS
Qty: 60 TABLET | Refills: 5 | Status: SHIPPED | OUTPATIENT
Start: 2023-02-10

## 2023-02-10 RX ORDER — FUROSEMIDE 40 MG/1
40 TABLET ORAL DAILY
Status: DISCONTINUED | OUTPATIENT
Start: 2023-02-10 | End: 2023-02-10 | Stop reason: HOSPADM

## 2023-02-10 RX ADMIN — CIPROFLOXACIN HYDROCHLORIDE 500 MG: 500 TABLET, FILM COATED ORAL at 05:02

## 2023-02-10 RX ADMIN — ONDANSETRON 8 MG: 8 TABLET, ORALLY DISINTEGRATING ORAL at 05:02

## 2023-02-10 RX ADMIN — DULOXETINE 60 MG: 30 CAPSULE, DELAYED RELEASE ORAL at 09:02

## 2023-02-10 RX ADMIN — INSULIN DETEMIR 10 UNITS: 100 INJECTION, SOLUTION SUBCUTANEOUS at 10:02

## 2023-02-10 RX ADMIN — METOPROLOL SUCCINATE 25 MG: 25 TABLET, EXTENDED RELEASE ORAL at 09:02

## 2023-02-10 RX ADMIN — INSULIN ASPART 2 UNITS: 100 INJECTION, SOLUTION INTRAVENOUS; SUBCUTANEOUS at 01:02

## 2023-02-10 RX ADMIN — FUROSEMIDE 40 MG: 10 INJECTION, SOLUTION INTRAMUSCULAR; INTRAVENOUS at 12:02

## 2023-02-10 RX ADMIN — SACUBITRIL AND VALSARTAN 1 TABLET: 97; 103 TABLET, FILM COATED ORAL at 09:02

## 2023-02-10 RX ADMIN — ACETAMINOPHEN 650 MG: 325 TABLET ORAL at 05:02

## 2023-02-10 RX ADMIN — SPIRONOLACTONE 25 MG: 25 TABLET, FILM COATED ORAL at 09:02

## 2023-02-10 NOTE — PLAN OF CARE
Recommendation:   1. Encourage intake at meals as tolerated.   2. Encourage diet compliance.   3. Monitor weight/labs.   4. RD to follow to monitor po intake    Goals:  Pt will tolerate diet with at least 50-75% intake at meals by RD follow up  Nutrition Goal Status: new

## 2023-02-10 NOTE — ASSESSMENT & PLAN NOTE
PAD (peripheral artery disease)  Nonischemic cardiomyopathy    Patient is identified as having Combined Systolic and Diastolic heart failure that is Acute on chronic. CHF is currently uncontrolled due to Continued edema of extremities. Latest ECHO performed and demonstrates- No results found for this or any previous visit.  . Continue Furosemide and Aldactone and monitor clinical status closely. Monitor on telemetry. Patient is on CHF pathway.  Monitor strict Is&Os and daily weights.  Place on fluid restriction of 1.5 L. Continue to stress to patient importance of self efficacy and  on diet for CHF. Last BNP reviewed- and noted below   Recent Labs   Lab 02/09/23  1335   *      CXR- Limited study due to body habitus.  Suspect diffuse pulmonary edema.  EKG without acute ischemic changes, reports 2nd degree A-V block (Mobitz II)   IV Lasix  Resume home Metoprolol, Entresto, spironolactone  Supplemental oxygen  S/p ICD  TTE   Consult cardiology    continue Gabapentin

## 2023-02-10 NOTE — ASSESSMENT & PLAN NOTE
Body mass index is 49.6 kg/m². Morbid obesity complicates all aspects of disease management from diagnostic modalities to treatment. Weight loss encouraged and health benefits explained to patient.

## 2023-02-10 NOTE — ASSESSMENT & PLAN NOTE
Followed by vascular surgery and WOC at [facility] with plans for US and angiogram soon  Medical management for now with asa, Statin, ARB  Continue Cipro

## 2023-02-10 NOTE — DISCHARGE SUMMARY
[facility] Medicine  Discharge Summary      Patient Name: Gama Oneil  MRN: [id number]  RIKY: [id number]  Patient Class: IP- Inpatient  Admission Date: 2/9/2023  Hospital Length of Stay: 1 days  Discharge Date and Time: 2/10/2023  5:58 PM  Attending Physician: Megha Cross*   Discharging Provider: Megha Cross MD  Primary Care Provider: Cisco Marquez MD    Primary Care Team: Networked reference to record PCT     HPI:   Gama Oneil is a 53 y/o F with PMH of obesity, DM II , hyperlipidemia, HTN, PAD, CHF, ROMEL, nonischemic cardiomyopathy, history of previous stroke presents to [facility] ED weight few hours history of shortness of breath that started last night at 10 p.m. when patient got up to use the bathroom.  There is associated cough, lower extremity swelling.  She denies chest pain, chills, fever chills diaphoresis, palpitation, nausea or vomiting.  Patient reported her Lasix was decreased from 40 mg to 20 mg few months ago and she only had turkey sandwich for dinner.   In the ED oxygen saturation 88 percent on room air, /90, pulse 120, RR 26 CXR with concerns for pulmonary edema, pro BNP 1120, INR 2,.  Started on supplemental oxygen and IV Lasix.  Admit hospital medicine for treatment of CHF exacerbation      * No surgery found *      Hospital Course:   No notes on file     Goals of Care Treatment Preferences:  Code Status: Full Code      Consults:   Consults (From admission, onward)          Status Ordering Provider     Inpatient consult to Registered Dietitian/Nutritionist  Once        Provider:  (Not yet assigned)    Completed MEGHA CROSS     Inpatient consult to Cardiology-[facility]  Once        Provider:  (Not yet assigned)    Completed MEGHA CROSS     Inpatient consult to Social Work/Case Management  Once        Provider:  (Not yet assigned)    Acknowledged EMGHA CROSS     Inpatient consult to Registered  Dietitian/Nutritionist  Once        Provider:  (Not yet assigned)    Completed INNOCENT-DOMINIQUE COREAS            * Acute exacerbation of CHF (congestive heart failure)  PAD (peripheral artery disease)  Nonischemic cardiomyopathy    Patient is identified as having Combined Systolic and Diastolic heart failure that is Acute on chronic. CHF is currently uncontrolled due to Continued edema of extremities. Latest ECHO performed and demonstrates- No results found for this or any previous visit.  . Continue Furosemide and Aldactone and monitor clinical status closely. Monitor on telemetry. Patient is on CHF pathway.  Monitor strict Is&Os and daily weights.  Place on fluid restriction of 1.5 L. Continue to stress to patient importance of self efficacy and  on diet for CHF. Last BNP reviewed- and noted below   Recent Labs   Lab 02/09/23  1335   *      CXR- Limited study due to body habitus.  Suspect diffuse pulmonary edema.  EKG without acute ischemic changes, reports 2nd degree A-V block (Mobitz II)   IV Lasix  Resume home Metoprolol, Entresto, spironolactone  Supplemental oxygen  S/p ICD  TTE   Consult cardiology    continue Gabapentin        left second toen open toe wound  Resume home Cipro  Consult wound care      ROMEL (obstructive sleep apnea)    CPAP qhs    Cerebral arteriosclerosis with history of previous stroke  Continue statin and Coumadin  INR is 2- continue to monitor      Type 2 diabetes mellitus, with long-term current use of insulin  Diabetic neuropathy  Patient's FSGs are uncontrolled due to hyperglycemia on current medication regimen.  Last A1c reviewed- No results found for: LABA1C, HGBA1C  Most recent fingerstick glucose reviewed-   Recent Labs   Lab 02/09/23  1402   POCTGLUCOSE 177*     Current correctional scale  Low  Maintain anti-hyperglycemic dose as follows-   Antihyperglycemics (From admission, onward)      Start     Stop Route Frequency Ordered    02/09/23 2100  insulin detemir  U-100 pen 10 Units         -- SubQ 2 times daily 02/09/23 1349    02/09/23 1449  insulin aspart U-100 pen 0-5 Units         -- SubQ Before meals & nightly PRN 02/09/23 1349          Hold Oral hypoglycemics while patient is in the hospital.  On Ozempic next dose due 2/10    PAD (peripheral artery disease)  Followed by vascular at [facility]  Continue Warfarin and statin      Severe obesity (BMI >= 40)  Body mass index is 49.6 kg/m². Morbid obesity complicates all aspects of disease management from diagnostic modalities to treatment. Weight loss encouraged and health benefits explained to patient.         Hypertension  Continue spironolactone, Lasix, Entresto,    Hyperlipidemia  On home Crestor- will use Lipitor while the patient        Final Active Diagnoses:    Diagnosis Date Noted POA    PRINCIPAL PROBLEM:  Acute exacerbation of CHF (congestive heart failure) [I50.9] 02/09/2023 Yes    Hyperlipidemia [E78.5] 02/09/2023 Yes    Hypertension [I10] 02/09/2023 Yes    Severe obesity (BMI >= 40) [E66.01] 02/09/2023 Yes    PAD (peripheral artery disease) [I73.9] 02/09/2023 Yes    Type 2 diabetes mellitus, with long-term current use of insulin [E11.9, Z79.4] 02/09/2023 Not Applicable    Nonischemic cardiomyopathy [I42.8] 02/09/2023 Yes    Cerebral arteriosclerosis with history of previous stroke [I67.2, Z86.73] 02/09/2023 Not Applicable    Diabetic neuropathy [E11.40] 02/09/2023 Yes    ROMEL (obstructive sleep apnea) [G47.33] 02/09/2023 Yes    left second toen open toe wound [S91.109A] 02/09/2023 Yes      Problems Resolved During this Admission:       Discharged Condition: stable    Disposition: Home or Self Care    Follow Up:    Patient Instructions:      Ambulatory referral/consult to Cardiology   Standing Status: Future   Referral Priority: Routine Referral Type: Consultation   Referral Reason: Specialty Services Required   Requested Specialty: Cardiology   Number of Visits Requested: 1     Diet diabetic     Diet Cardiac      Activity as tolerated           Pending Diagnostic Studies:       None           Medications:  Reconciled Home Medications:      Medication List        START taking these medications      ciprofloxacin HCl 500 MG tablet  Commonly known as: CIPRO  Take 1 tablet (500 mg total) by mouth every 12 (twelve) hours. for 8 days     ENTRESTO  mg per tablet  Generic drug: sacubitriL-valsartan  Take 1 tablet by mouth 2 (two) times daily.            CHANGE how you take these medications      furosemide 40 MG tablet  Commonly known as: LASIX  Take 1 tablet (40 mg total) by mouth once daily. ( take additional 20 mg for weight gain of 2 pounds in 24 hours or 5 pounds in 1 week),  What changed:   medication strength  how much to take     metFORMIN 1000 MG tablet  Commonly known as: GLUCOPHAGE  Take 1 tablet (1,000 mg total) by mouth 2 (two) times daily with meals.  What changed: when to take this     metoprolol succinate 25 MG 24 hr tablet  Commonly known as: TOPROL-XL  Take 1 tablet (25 mg total) by mouth once daily.  What changed:   medication strength  how much to take  when to take this            CONTINUE taking these medications      DULoxetine 60 MG capsule  Commonly known as: CYMBALTA  Take 60 mg by mouth 2 (two) times a day.     gabapentin 100 MG capsule  Commonly known as: NEURONTIN  Take 100 mg by mouth 2 (two) times daily.     insulin glargine 100 unit/mL injection  Commonly known as: Lantus  Inject into the skin every evening. 40 units in AM, 50 units in PM     lactulose 10 gram/15 mL solution  Commonly known as: CHRONULAC  Take 10 g by mouth every 4 (four) hours as needed for Constipation.     mometasone 50 mcg/actuation nasal spray  Commonly known as: NASONEX  2 sprays by Nasal route once daily.     OZEMPIC 1 mg/dose (4 mg/3 mL)  Generic drug: semaglutide  Inject 1 mg into the skin Every Friday.     rosuvastatin 20 MG tablet  Commonly known as: CRESTOR  Take 40 mg by mouth once daily.     spironolactone 25  MG tablet  Commonly known as: ALDACTONE  Take 1 tablet by mouth once daily.     warfarin 5 MG tablet  Commonly known as: COUMADIN  Take 1 tablet (5 mg total) by mouth Daily.            STOP taking these medications      glimepiride 4 MG tablet  Commonly known as: AMARYL     glipiZIDE 5 MG Tr24     insulin lispro 100 unit/mL injection     LORazepam 1 MG tablet  Commonly known as: ATIVAN              Indwelling Lines/Drains at time of discharge:   Lines/Drains/Airways       None                   Time spent on the discharge of patient: 35 minutes         Megha Cross MD  Department of Hospital Medicine  [place] - Telemetry

## 2023-02-10 NOTE — PLAN OF CARE
Problem: Adult Inpatient Plan of Care  Goal: Plan of Care Review  Outcome: Ongoing, Progressing   A&Ox4. Vital signs stable. No complaints of pain this shift. Tolerating PO intake. Urine output adequate. Repositioned independently with minimal assistance. Safety precautions in place. Plan of care reviewed with patient and family.

## 2023-02-10 NOTE — ASSESSMENT & PLAN NOTE
Contributing Nutrition Diagnosis  Obesity    Related to (etiology):   Excess energy intake  Physical inactivity    Signs and Symptoms (as evidenced by):   BMI 49    Interventions:  Collaboration with other providers  Sodium, fat, carbohydrate restricted diet    Nutrition Diagnosis Status:   New

## 2023-02-10 NOTE — PROGRESS NOTES
[facility] - Telemetry  Cardiology  Progress Note    Patient Name: Gama Oneil  MRN: [id number]  Admission Date: 2023  Hospital Length of Stay: 1 days  Code Status: Full Code   Attending Physician: Megha Cross*   Primary Care Physician: Cisco Marquez MD  Expected Discharge Date:   Principal Problem:Acute exacerbation of CHF (congestive heart failure)    Subjective:     Hospital Course:   2023 Per HPI   02/10/2023 TTE   The left ventricle is moderately enlarged with eccentric hypertrophy and severely decreased systolic function.   The estimated ejection fraction is 25%.   Left ventricular diastolic dysfunction.   Normal right ventricular size with normal right ventricular systolic function.   Moderate left atrial enlargement.  Patient feeling back to baseline. Lasix converted to p.o. 40 mg daily with instruction for additional dose for weight gain. Hemodynamically stable overnight.       Past Medical History:   Diagnosis Date    Blood clot associated with vein wall inflammation     CHF (congestive heart failure)     Diabetes mellitus     High cholesterol     Hypertension     Mixed hyperlipidemia     NICM (nonischemic cardiomyopathy)     ROMEL (obstructive sleep apnea)     PAD (peripheral artery disease)     PAD (peripheral artery disease)     Stroke        Past Surgical History:   Procedure Laterality Date    APPENDECTOMY      CARDIAC CATHETERIZATION      CARDIAC DEFIBRILLATOR PLACEMENT       SECTION         Review of patient's allergies indicates:   Allergen Reactions    Codeine        No current facility-administered medications on file prior to encounter.     Current Outpatient Medications on File Prior to Encounter   Medication Sig    DULoxetine (CYMBALTA) 60 MG capsule Take 60 mg by mouth 2 (two) times a day.    furosemide (LASIX) 20 MG tablet Take 20 mg by mouth once daily.    gabapentin (NEURONTIN) 100 MG capsule Take 100 mg by mouth 2 (two) times daily.     glimepiride (AMARYL) 4 MG tablet Take 8 mg by mouth with lunch.    glipiZIDE (GLUCOTROL) 5 MG TR24 Take 10 mg by mouth after lunch.    insulin glargine (LANTUS) 100 unit/mL injection Inject into the skin every evening. 40 units in AM, 50 units in PM    insulin lispro (HUMALOG) 100 unit/mL injection Inject into the skin 3 (three) times daily before meals.    lactulose (CHRONULAC) 10 gram/15 mL solution Take 10 g by mouth every 4 (four) hours as needed for Constipation.    metformin (GLUCOPHAGE) 1000 MG tablet Take 1,000 mg by mouth every evening.    metoprolol succinate (TOPROL-XL) 100 MG 24 hr tablet Take 1 tablet by mouth 2 (two) times daily.    mometasone (NASONEX) 50 mcg/actuation nasal spray 2 sprays by Nasal route once daily.    OZEMPIC 1 mg/dose (4 mg/3 mL) Inject 1 mg into the skin Every Friday.    rosuvastatin (CRESTOR) 20 MG tablet Take 40 mg by mouth once daily.    spironolactone (ALDACTONE) 25 MG tablet Take 1 tablet by mouth once daily.    warfarin (COUMADIN) 5 MG tablet Take 5 mg by mouth Daily.    LORazepam (ATIVAN) 1 MG tablet Take 1 tablet (1 mg total) by mouth 2 (two) times daily. As needed for muscle tightness     Family History    None       Tobacco Use    Smoking status: Never    Smokeless tobacco: Never   Substance and Sexual Activity    Alcohol use: No    Drug use: No    Sexual activity: Not on file     Review of Systems   Constitutional: Negative. Negative for diaphoresis.   HENT: Negative.     Eyes: Negative.    Cardiovascular:  Negative for chest pain, leg swelling, near-syncope, orthopnea, palpitations, paroxysmal nocturnal dyspnea and syncope.   Respiratory:  Negative for shortness of breath.    Endocrine: Negative.    Hematologic/Lymphatic: Negative.    Skin:  Positive for poor wound healing.   Gastrointestinal:  Negative for abdominal pain, nausea and vomiting.   Genitourinary: Negative.    Neurological: Negative.    Psychiatric/Behavioral: Negative.     Objective:      Vital Signs (Most Recent):  Temp: 97.5 °F (36.4 °C) (02/10/23 0821)  Pulse: 103 (02/10/23 0821)  Resp: 16 (02/10/23 0821)  BP: 120/73 (02/10/23 0821)  SpO2: (!) 94 % (02/10/23 0821) Vital Signs (24h Range):  Temp:  [97.2 °F (36.2 °C)-99.2 °F (37.3 °C)] 97.5 °F (36.4 °C)  Pulse:  [] 103  Resp:  [16-26] 16  SpO2:  [94 %-99 %] 94 %  BP: (106-134)/(53-85) 120/73     Weight: 135.2 kg (298 lb 1 oz)  Body mass index is 49.6 kg/m².    SpO2: (!) 94 %         Intake/Output Summary (Last 24 hours) at 2/10/2023 0953  Last data filed at 2/10/2023 0602  Gross per 24 hour   Intake --   Output 1100 ml   Net -1100 ml         Lines/Drains/Airways       Peripheral Intravenous Line  Duration                  Peripheral IV - Single Lumen 02/09/23 0147 20 G Right Antecubital 1 day                    Physical Exam  Constitutional:       General: She is not in acute distress.     Appearance: She is obese. She is not diaphoretic.   HENT:      Head: Atraumatic.   Eyes:      General:         Right eye: No discharge.         Left eye: No discharge.   Cardiovascular:      Rate and Rhythm: Normal rate and regular rhythm.   Pulmonary:      Breath sounds: No rales.   Abdominal:      General: Bowel sounds are normal.      Palpations: Abdomen is soft.   Musculoskeletal:      Right lower leg: No edema.      Left lower leg: No edema.   Skin:     General: Skin is warm and dry.   Neurological:      Mental Status: She is alert and oriented to person, place, and time.   Psychiatric:         Mood and Affect: Mood normal.         Behavior: Behavior normal.         Thought Content: Thought content normal.         Judgment: Judgment normal.       Significant Labs: BMP:   Recent Labs   Lab 02/09/23  0153 02/09/23  1335 02/10/23  0309   *  --  187*     --  139   K 4.3  --  3.8     --  107   CO2 24  --  19*   BUN 20*  --  22*   CREATININE 1.30  --  1.4   CALCIUM 9.1  --  9.3   MG  --  1.5*  --      , CMP   Recent Labs   Lab  02/09/23  0153 02/10/23  0309    139   K 4.3 3.8    107   CO2 24 19*   * 187*   BUN 20* 22*   CREATININE 1.30 1.4   CALCIUM 9.1 9.3   PROT 7.8  --    ALBUMIN 4.3  --    BILITOT 0.4  --    ALKPHOS 77  --    AST 31  --    ALT 28  --    ANIONGAP 7* 13     , CBC   Recent Labs   Lab 02/09/23 0153   WBC 8.19   HGB 11.6*   HCT 37.4        , INR   Recent Labs   Lab 02/09/23  0153 02/10/23  0309   INR 2.0* 2.0*     , Lipid Panel No results for input(s): CHOL, HDL, LDLCALC, TRIG, CHOLHDL in the last 48 hours., Troponin   Recent Labs   Lab 02/09/23 0153   TROPONINI <0.012     , and All pertinent lab results from the last 24 hours have been reviewed.    Significant Imaging: Echocardiogram: Transthoracic echo (TTE) complete ([facility] Only):   Results for orders placed or performed during the hospital encounter of 02/09/23   Echo   Result Value Ref Range    BSA 2.49 m2    TDI SEPTAL 0.05 m/s    Left Ventricular Outflow Tract Mean Velocity 0.42 cm/s    Left Ventricular Outflow Tract Mean Gradient 0.81 mmHg    Pulmonary Valve Mean Velocity 0.41 m/s    AORTIC VALVE CUSP SEPERATION 2.02 cm    TDI LATERAL 0.05 m/s    PV PEAK VELOCITY 0.58 cm/s    LVIDd 6.10 (A) 3.5 - 6.0 cm    IVS 1.04 0.6 - 1.1 cm    Posterior Wall 1.00 (A) 0.6 - 1.1 cm    Ao root annulus 3.05 cm    LVIDs 5.22 (A) 2.1 - 4.0 cm    FS 14 28 - 44 %    LV mass 260.49 g    LA size 4.70 cm    Left Ventricle Relative Wall Thickness 0.33 cm    AV mean gradient 2 mmHg    AV valve area 2.25 cm2    AV Velocity Ratio 0.66     AV index (prosthetic) 0.72     MV mean gradient 1 mmHg    MV valve area by continuity eq 1.69 cm2    Mean e' 0.05 m/s    IVRT 72.31 msec    Pulm vein S/D ratio 0.78     LVOT diameter 2.00 cm    LVOT area 3.1 cm2    LVOT peak ladarius 0.65 m/s    LVOT peak VTI 11.70 cm    Ao peak ladarius 0.98 m/s    Ao VTI 16.3 cm    Mr max ladarius 3.48 m/s    LVOT stroke volume 36.74 cm3    AV peak gradient 4 mmHg    MV peak gradient 4 mmHg    TR Max Ladarius 2.07  m/s    MV VTI 21.8 cm    PV Peak S Ladarius 0.29 m/s    PV Peak D Ladarius 0.37 m/s    LV Systolic Volume 130.59 mL    LV Systolic Volume Index 55.8 mL/m2    LV Diastolic Volume 187.08 mL    LV Diastolic Volume Index 79.95 mL/m2    LV Mass Index 111 g/m2    Triscuspid Valve Regurgitation Peak Gradient 17 mmHg    LA WIDTH 4.90 cm    EF 25 %    LA volume 106.61 cm3    RVDD 2.90 cm    TAPSE 2.00 cm    Right ventricular length in diastole (apical 4-chamber view) 6.10 cm    LA Volume Index 45.6 mL/m2    RA Major Axis 5.30 cm    Left Atrium Minor Axis 5.30 cm    Left Atrium Major Axis 5.60 cm    RA Width 4.10 cm    Narrative    · The left ventricle is moderately enlarged with eccentric hypertrophy and   severely decreased systolic function.  · The estimated ejection fraction is 25%.  · Left ventricular diastolic dysfunction.  · Normal right ventricular size with normal right ventricular systolic   function.  · Moderate left atrial enlargement.        Assessment and Plan:     Brief HPI: Patient seen this morning on rounds without cardiac complaint, feeling back to baseline.     ROMEL (obstructive sleep apnea)  CPAP at     Cerebral arteriosclerosis with history of previous stroke  Continue Coumadin and statin     Nonischemic cardiomyopathy  LVEF reported ~ 30% - records unavailable   Repeat TTE essentially unchanged   S/p ICD- no discharges reported  ADHF  resolved  Medications recently adjusted as outpatient   Patient cleared for DC with Lasix 40 mg daily (additional 20 mg for weight gain of 2 pounds in 24 hours or 5 pounds in 1 week), Entresto, BB, Aldactone   Follow up with [facility] cardiology   Cleared for DC     Type 2 diabetes mellitus, with long-term current use of insulin  HA1c 8.1  AccuChecks AC/HS SSI        PAD (peripheral artery disease)  Followed by vascular surgery and WOC at [facility] with plans for US and angiogram soon  Medical management for now with asa, Statin, ARB  Continue Cipro        Severe obesity (BMI >= 40)  Weight  loss encouraged     Hypertension  -130s  Continue home antihypertensives         VTE Risk Mitigation (From admission, onward)         Ordered     warfarin (COUMADIN) tablet 5 mg  Daily         02/09/23 1627     IP VTE HIGH RISK PATIENT  Once         02/09/23 1127     Place sequential compression device  Until discontinued         02/09/23 1127                Moo Santiago NP  Cardiology  [facility] - Telemetry

## 2023-02-10 NOTE — PROGRESS NOTES
[facility] Medicine  Progress Note    Patient Name: Gama Oneil  MRN: [id number]  Patient Class: IP- Inpatient   Admission Date: 2/9/2023  Length of Stay: 1 days  Attending Physician: Megha Cross*  Primary Care Provider: Cisco Marquez MD        Subjective:     Principal Problem:Acute exacerbation of CHF (congestive heart failure)        HPI:  Gama Oneil is a 53 y/o F with PMH of obesity, DM II , hyperlipidemia, HTN, PAD, CHF, ROMEL, nonischemic cardiomyopathy, history of previous stroke presents to [facility] ED weight few hours history of shortness of breath that started last night at 10 p.m. when patient got up to use the bathroom.  There is associated cough, lower extremity swelling.  She denies chest pain, chills, fever chills diaphoresis, palpitation, nausea or vomiting.  Patient reported her Lasix was decreased from 40 mg to 20 mg few months ago and she only had turkey sandwich for dinner.   In the ED oxygen saturation 88 percent on room air, /90, pulse 120, RR 26 CXR with concerns for pulmonary edema, pro BNP 1120, INR 2,.  Started on supplemental oxygen and IV Lasix.  Admit hospital medicine for treatment of CHF exacerbation      Overview/Hospital Course:  No notes on file    Interval History: awake and alert,   Diuresing, appreciate cardiology recs switch to p.o. Isis and possible discharge todayadditional 20 mg for weight gain of 2 pounds in 24 hours or 5 pounds in 1 week),    Review of Systems   Constitutional:  Positive for activity change. Negative for appetite change, diaphoresis, fatigue and fever.   HENT:  Negative for congestion.    Eyes:  Negative for photophobia and visual disturbance.   Respiratory:  Negative for cough, chest tightness and shortness of breath.    Cardiovascular:  Positive for leg swelling. Negative for palpitations.   Endocrine: Negative for cold intolerance and heat intolerance.   Genitourinary:  Negative for dysuria.   Musculoskeletal:   Negative for arthralgias and neck stiffness.   Skin:  Negative for color change and wound.   Neurological:  Positive for weakness.   Psychiatric/Behavioral:  Negative for agitation.    Objective:     Vital Signs (Most Recent):  Temp: 97.5 °F (36.4 °C) (02/10/23 0821)  Pulse: 103 (02/10/23 0821)  Resp: 16 (02/10/23 0821)  BP: 120/73 (02/10/23 0821)  SpO2: (!) 94 % (02/10/23 0821)   Vital Signs (24h Range):  Temp:  [97.2 °F (36.2 °C)-99.2 °F (37.3 °C)] 97.5 °F (36.4 °C)  Pulse:  [] 103  Resp:  [16-26] 16  SpO2:  [94 %-99 %] 94 %  BP: (106-134)/(53-85) 120/73     Weight: 135.2 kg (298 lb 1 oz)  Body mass index is 49.6 kg/m².    Intake/Output Summary (Last 24 hours) at 2/10/2023 1046  Last data filed at 2/10/2023 0602  Gross per 24 hour   Intake --   Output 1100 ml   Net -1100 ml      Physical Exam  Constitutional:       Appearance: She is obese. She is not diaphoretic.   HENT:      Head: Normocephalic and atraumatic.   Cardiovascular:      Rate and Rhythm: Normal rate.   Pulmonary:      Effort: Pulmonary effort is normal.   Abdominal:      General: Bowel sounds are normal.      Palpations: Abdomen is soft.      Comments: obese   Musculoskeletal:      Cervical back: Normal range of motion.      Right lower leg: Edema present.      Left lower leg: Edema present.   Skin:     General: Skin is warm.      Comments: Left fifth digit dressing in place   Neurological:      General: No focal deficit present.      Mental Status: She is alert and oriented to person, place, and time. Mental status is at baseline.   Psychiatric:         Mood and Affect: Mood normal.         Behavior: Behavior normal.       Significant Labs: A1C:   Recent Labs   Lab 02/09/23  1335   HGBA1C 8.1*     ABGs: No results for input(s): PH, PCO2, HCO3, POCSATURATED, BE, TOTALHB, COHB, METHB, O2HB, POCFIO2, PO2 in the last 48 hours.  Blood Culture: No results for input(s): LABBLOO in the last 48 hours.  CBC:   Recent Labs   Lab 02/09/23  0153   WBC  8.19   HGB 11.6*   HCT 37.4        CMP:   Recent Labs   Lab 02/09/23  0153 02/10/23  0309    139   K 4.3 3.8    107   CO2 24 19*   * 187*   BUN 20* 22*   CREATININE 1.30 1.4   CALCIUM 9.1 9.3   PROT 7.8  --    ALBUMIN 4.3  --    BILITOT 0.4  --    ALKPHOS 77  --    AST 31  --    ALT 28  --    ANIONGAP 7* 13     Lactic Acid: No results for input(s): LACTATE in the last 48 hours.  Lipase: No results for input(s): LIPASE in the last 48 hours.  Lipid Panel: No results for input(s): CHOL, HDL, LDLCALC, TRIG, CHOLHDL in the last 48 hours.  Magnesium:   Recent Labs   Lab 02/09/23  1335   MG 1.5*     Troponin:   Recent Labs   Lab 02/09/23  0153   TROPONINI <0.012     TSH: No results for input(s): TSH in the last 4320 hours.  Urine Culture: No results for input(s): LABURIN in the last 48 hours.  Urine Studies: No results for input(s): COLORU, APPEARANCEUA, PHUR, SPECGRAV, PROTEINUA, GLUCUA, KETONESU, BILIRUBINUA, OCCULTUA, NITRITE, UROBILINOGEN, LEUKOCYTESUR, RBCUA, WBCUA, BACTERIA, SQUAMEPITHEL, HYALINECASTS in the last 48 hours.    Invalid input(s): WRIGHTSUR    Significant Imaging: I have reviewed and interpreted all pertinent imaging results/findings within the past 24 hours.      Assessment/Plan:      * Acute exacerbation of CHF (congestive heart failure)  PAD (peripheral artery disease)  Nonischemic cardiomyopathy    Patient is identified as having Combined Systolic and Diastolic heart failure that is Acute on chronic. CHF is currently uncontrolled due to Continued edema of extremities. Latest ECHO performed and demonstrates- No results found for this or any previous visit.  . Continue Furosemide and Aldactone and monitor clinical status closely. Monitor on telemetry. Patient is on CHF pathway.  Monitor strict Is&Os and daily weights.  Place on fluid restriction of 1.5 L. Continue to stress to patient importance of self efficacy and  on diet for CHF. Last BNP reviewed- and noted below    Recent Labs   Lab 02/09/23  1335   *      CXR- Limited study due to body habitus.  Suspect diffuse pulmonary edema.  EKG without acute ischemic changes, reports 2nd degree A-V block (Mobitz II)   IV Lasix  Resume home Metoprolol, Entresto, spironolactone  Supplemental oxygen  S/p ICD  TTE   Consult cardiology    continue Gabapentin        left second toen open toe wound  Resume home Cipro  Consult wound care      ROMEL (obstructive sleep apnea)    CPAP qhs    Cerebral arteriosclerosis with history of previous stroke  Continue statin and Coumadin  INR is 2- continue to monitor      Type 2 diabetes mellitus, with long-term current use of insulin  Diabetic neuropathy  Patient's FSGs are uncontrolled due to hyperglycemia on current medication regimen.  Last A1c reviewed- No results found for: LABA1C, HGBA1C  Most recent fingerstick glucose reviewed-   Recent Labs   Lab 02/09/23  1402   POCTGLUCOSE 177*     Current correctional scale  Low  Maintain anti-hyperglycemic dose as follows-   Antihyperglycemics (From admission, onward)    Start     Stop Route Frequency Ordered    02/09/23 2100  insulin detemir U-100 pen 10 Units         -- SubQ 2 times daily 02/09/23 1349    02/09/23 1449  insulin aspart U-100 pen 0-5 Units         -- SubQ Before meals & nightly PRN 02/09/23 1349        Hold Oral hypoglycemics while patient is in the hospital.  On Ozempic next dose due 2/10    PAD (peripheral artery disease)  Followed by vascular at [facility]  Continue Warfarin and statin      Severe obesity (BMI >= 40)  Body mass index is 49.6 kg/m². Morbid obesity complicates all aspects of disease management from diagnostic modalities to treatment. Weight loss encouraged and health benefits explained to patient.         Hypertension  Continue spironolactone, Lasix, Entresto,    Hyperlipidemia  On home Crestor- will use Lipitor while the patient        VTE Risk Mitigation (From admission, onward)         Ordered     warfarin (COUMADIN)  tablet 5 mg  Daily         02/09/23 1627     IP VTE HIGH RISK PATIENT  Once         02/09/23 1127     Place sequential compression device  Until discontinued         02/09/23 1127                Discharge Planning   MAGNO: 2/10/2023     Code Status: Full Code   Is the patient medically ready for discharge?:     Reason for patient still in hospital (select all that apply): Pending disposition  Discharge Plan A: Home with family                  Megha Cross MD  Department of Hospital Medicine   [place] - [facility] [place]

## 2023-02-10 NOTE — SUBJECTIVE & OBJECTIVE
Past Medical History:   Diagnosis Date    Blood clot associated with vein wall inflammation     CHF (congestive heart failure)     Diabetes mellitus     High cholesterol     Hypertension     Mixed hyperlipidemia     NICM (nonischemic cardiomyopathy)     ROMEL (obstructive sleep apnea)     PAD (peripheral artery disease)     PAD (peripheral artery disease)     Stroke        Past Surgical History:   Procedure Laterality Date    APPENDECTOMY      CARDIAC CATHETERIZATION      CARDIAC DEFIBRILLATOR PLACEMENT       SECTION         Review of patient's allergies indicates:   Allergen Reactions    Codeine        No current facility-administered medications on file prior to encounter.     Current Outpatient Medications on File Prior to Encounter   Medication Sig    DULoxetine (CYMBALTA) 60 MG capsule Take 60 mg by mouth 2 (two) times a day.    furosemide (LASIX) 20 MG tablet Take 20 mg by mouth once daily.    gabapentin (NEURONTIN) 100 MG capsule Take 100 mg by mouth 2 (two) times daily.    glimepiride (AMARYL) 4 MG tablet Take 8 mg by mouth with lunch.    glipiZIDE (GLUCOTROL) 5 MG TR24 Take 10 mg by mouth after lunch.    insulin glargine (LANTUS) 100 unit/mL injection Inject into the skin every evening. 40 units in AM, 50 units in PM    insulin lispro (HUMALOG) 100 unit/mL injection Inject into the skin 3 (three) times daily before meals.    lactulose (CHRONULAC) 10 gram/15 mL solution Take 10 g by mouth every 4 (four) hours as needed for Constipation.    metformin (GLUCOPHAGE) 1000 MG tablet Take 1,000 mg by mouth every evening.    metoprolol succinate (TOPROL-XL) 100 MG 24 hr tablet Take 1 tablet by mouth 2 (two) times daily.    mometasone (NASONEX) 50 mcg/actuation nasal spray 2 sprays by Nasal route once daily.    OZEMPIC 1 mg/dose (4 mg/3 mL) Inject 1 mg into the skin Every Friday.    rosuvastatin (CRESTOR) 20 MG tablet Take 40 mg by mouth once daily.    spironolactone (ALDACTONE) 25 MG tablet Take 1 tablet by  mouth once daily.    warfarin (COUMADIN) 5 MG tablet Take 5 mg by mouth Daily.    LORazepam (ATIVAN) 1 MG tablet Take 1 tablet (1 mg total) by mouth 2 (two) times daily. As needed for muscle tightness     Family History    None       Tobacco Use    Smoking status: Never    Smokeless tobacco: Never   Substance and Sexual Activity    Alcohol use: No    Drug use: No    Sexual activity: Not on file     Review of Systems   Constitutional: Negative. Negative for diaphoresis.   HENT: Negative.     Eyes: Negative.    Cardiovascular:  Negative for chest pain, leg swelling, near-syncope, orthopnea, palpitations, paroxysmal nocturnal dyspnea and syncope.   Respiratory:  Negative for shortness of breath.    Endocrine: Negative.    Hematologic/Lymphatic: Negative.    Skin:  Positive for poor wound healing.   Gastrointestinal:  Negative for abdominal pain, nausea and vomiting.   Genitourinary: Negative.    Neurological: Negative.    Psychiatric/Behavioral: Negative.     Objective:     Vital Signs (Most Recent):  Temp: 97.5 °F (36.4 °C) (02/10/23 0821)  Pulse: 103 (02/10/23 0821)  Resp: 16 (02/10/23 0821)  BP: 120/73 (02/10/23 0821)  SpO2: (!) 94 % (02/10/23 0821) Vital Signs (24h Range):  Temp:  [97.2 °F (36.2 °C)-99.2 °F (37.3 °C)] 97.5 °F (36.4 °C)  Pulse:  [] 103  Resp:  [16-26] 16  SpO2:  [94 %-99 %] 94 %  BP: (106-134)/(53-85) 120/73     Weight: 135.2 kg (298 lb 1 oz)  Body mass index is 49.6 kg/m².    SpO2: (!) 94 %         Intake/Output Summary (Last 24 hours) at 2/10/2023 0953  Last data filed at 2/10/2023 0602  Gross per 24 hour   Intake --   Output 1100 ml   Net -1100 ml         Lines/Drains/Airways       Peripheral Intravenous Line  Duration                  Peripheral IV - Single Lumen 02/09/23 0147 20 G Right Antecubital 1 day                    Physical Exam  Constitutional:       General: She is not in acute distress.     Appearance: She is obese. She is not diaphoretic.   HENT:      Head: Atraumatic.   Eyes:       General:         Right eye: No discharge.         Left eye: No discharge.   Cardiovascular:      Rate and Rhythm: Normal rate and regular rhythm.   Pulmonary:      Breath sounds: No rales.   Abdominal:      General: Bowel sounds are normal.      Palpations: Abdomen is soft.   Musculoskeletal:      Right lower leg: No edema.      Left lower leg: No edema.   Skin:     General: Skin is warm and dry.   Neurological:      Mental Status: She is alert and oriented to person, place, and time.   Psychiatric:         Mood and Affect: Mood normal.         Behavior: Behavior normal.         Thought Content: Thought content normal.         Judgment: Judgment normal.       Significant Labs: BMP:   Recent Labs   Lab 02/09/23  0153 02/09/23  1335 02/10/23  0309   *  --  187*     --  139   K 4.3  --  3.8     --  107   CO2 24  --  19*   BUN 20*  --  22*   CREATININE 1.30  --  1.4   CALCIUM 9.1  --  9.3   MG  --  1.5*  --      , CMP   Recent Labs   Lab 02/09/23  0153 02/10/23  0309    139   K 4.3 3.8    107   CO2 24 19*   * 187*   BUN 20* 22*   CREATININE 1.30 1.4   CALCIUM 9.1 9.3   PROT 7.8  --    ALBUMIN 4.3  --    BILITOT 0.4  --    ALKPHOS 77  --    AST 31  --    ALT 28  --    ANIONGAP 7* 13     , CBC   Recent Labs   Lab 02/09/23 0153   WBC 8.19   HGB 11.6*   HCT 37.4        , INR   Recent Labs   Lab 02/09/23  0153 02/10/23  0309   INR 2.0* 2.0*     , Lipid Panel No results for input(s): CHOL, HDL, LDLCALC, TRIG, CHOLHDL in the last 48 hours., Troponin   Recent Labs   Lab 02/09/23 0153   TROPONINI <0.012     , and All pertinent lab results from the last 24 hours have been reviewed.    Significant Imaging: Echocardiogram: Transthoracic echo (TTE) complete (Cupid Only):   Results for orders placed or performed during the hospital encounter of 02/09/23   Echo   Result Value Ref Range    BSA 2.49 m2    TDI SEPTAL 0.05 m/s    Left Ventricular Outflow Tract Mean Velocity 0.42 cm/s     Left Ventricular Outflow Tract Mean Gradient 0.81 mmHg    Pulmonary Valve Mean Velocity 0.41 m/s    AORTIC VALVE CUSP SEPERATION 2.02 cm    TDI LATERAL 0.05 m/s    PV PEAK VELOCITY 0.58 cm/s    LVIDd 6.10 (A) 3.5 - 6.0 cm    IVS 1.04 0.6 - 1.1 cm    Posterior Wall 1.00 (A) 0.6 - 1.1 cm    Ao root annulus 3.05 cm    LVIDs 5.22 (A) 2.1 - 4.0 cm    FS 14 28 - 44 %    LV mass 260.49 g    LA size 4.70 cm    Left Ventricle Relative Wall Thickness 0.33 cm    AV mean gradient 2 mmHg    AV valve area 2.25 cm2    AV Velocity Ratio 0.66     AV index (prosthetic) 0.72     MV mean gradient 1 mmHg    MV valve area by continuity eq 1.69 cm2    Mean e' 0.05 m/s    IVRT 72.31 msec    Pulm vein S/D ratio 0.78     LVOT diameter 2.00 cm    LVOT area 3.1 cm2    LVOT peak ladarius 0.65 m/s    LVOT peak VTI 11.70 cm    Ao peak ladarius 0.98 m/s    Ao VTI 16.3 cm    Mr max ladarius 3.48 m/s    LVOT stroke volume 36.74 cm3    AV peak gradient 4 mmHg    MV peak gradient 4 mmHg    TR Max Ladarius 2.07 m/s    MV VTI 21.8 cm    PV Peak S Ladarius 0.29 m/s    PV Peak D Ladarius 0.37 m/s    LV Systolic Volume 130.59 mL    LV Systolic Volume Index 55.8 mL/m2    LV Diastolic Volume 187.08 mL    LV Diastolic Volume Index 79.95 mL/m2    LV Mass Index 111 g/m2    Triscuspid Valve Regurgitation Peak Gradient 17 mmHg    LA WIDTH 4.90 cm    EF 25 %    LA volume 106.61 cm3    RVDD 2.90 cm    TAPSE 2.00 cm    Right ventricular length in diastole (apical 4-chamber view) 6.10 cm    LA Volume Index 45.6 mL/m2    RA Major Axis 5.30 cm    Left Atrium Minor Axis 5.30 cm    Left Atrium Major Axis 5.60 cm    RA Width 4.10 cm    Narrative    · The left ventricle is moderately enlarged with eccentric hypertrophy and   severely decreased systolic function.  · The estimated ejection fraction is 25%.  · Left ventricular diastolic dysfunction.  · Normal right ventricular size with normal right ventricular systolic   function.  · Moderate left atrial enlargement.

## 2023-02-10 NOTE — PLAN OF CARE
AVS and educational attachments shared with patient via [employer]. Discussed thoroughly. Patient verbalized clear understanding using teach back method. Notified bedside nurse of completion of education. At present no distress noted. Patient to be discharged via w/c with escort service and family with all of patient's belonings. Will cont to be available to patient and family and intervene prn.

## 2023-02-10 NOTE — PLAN OF CARE
Pt for d/c to home today  --   No DME, No HH --   Per initial assessment - pt has transportation to home   Discharging nurse to review d/c meds/instructions       MD Yakelin Trejo MD  Cardiology [phone] [address] [facility] [address] [facility] [facility] [place] [address]      Next Steps: Follow up on 5/29/2023  Appointment:   Instructions: YOU WILL BE CONTACTED WITH A SOONER APT PER MS. AMIN AT DOCTOR'S OFFICE    MD Poonam Bonds MD  Internal Medicine [phone] [address] [place] [address]     Next Steps: Follow up on 2/22/2023  Appointment:   Instructions: 9:00 am          02/10/23 1435   Final Note   Assessment Type Final Discharge Note   Anticipated Discharge Disposition Home   What phone number can be called within the next 1-3 days to see how you are doing after discharge? [phone]   Hospital Resources/Appts/Education Provided Appointments scheduled and added to AVS   Post-Acute Status   Discharge Delays None known at this time

## 2023-02-10 NOTE — PROGRESS NOTES
[facility]                    Pharmacy       Discharge Medication Education    Patient ACCEPTED medication education. Pharmacy has provided education on the name, indication, and possible side effects of the medication(s) prescribed, using teach-back method.     The following medications have also been discussed, during this admission.        Medication List        START taking these medications      ciprofloxacin HCl 500 MG tablet  Commonly known as: CIPRO  Take 1 tablet (500 mg total) by mouth every 12 (twelve) hours. for 8 days     ENTRESTO  mg per tablet  Generic drug: sacubitriL-valsartan  Take 1 tablet by mouth 2 (two) times daily.            CHANGE how you take these medications      furosemide 40 MG tablet  Commonly known as: LASIX  Take 1 tablet (40 mg total) by mouth once daily. ( take additional 20 mg for weight gain of 2 pounds in 24 hours or 5 pounds in 1 week),  What changed:   medication strength  how much to take     metFORMIN 1000 MG tablet  Commonly known as: GLUCOPHAGE  Take 1 tablet (1,000 mg total) by mouth 2 (two) times daily with meals.  What changed: when to take this     metoprolol succinate 25 MG 24 hr tablet  Commonly known as: TOPROL-XL  Take 1 tablet (25 mg total) by mouth once daily.  Start taking on: February 11, 2023  What changed:   medication strength  how much to take  when to take this            CONTINUE taking these medications      DULoxetine 60 MG capsule  Commonly known as: CYMBALTA     gabapentin 100 MG capsule  Commonly known as: NEURONTIN     insulin glargine 100 unit/mL injection  Commonly known as: Lantus     lactulose 10 gram/15 mL solution  Commonly known as: CHRONULAC     mometasone 50 mcg/actuation nasal spray  Commonly known as: NASONEX     OZEMPIC 1 mg/dose (4 mg/3 mL)  Generic drug: semaglutide     rosuvastatin 20 MG tablet  Commonly known as: CRESTOR     spironolactone 25 MG tablet  Commonly known as: ALDACTONE            STOP taking  these medications      glimepiride 4 MG tablet  Commonly known as: AMARYL     glipiZIDE 5 MG Tr24     insulin lispro 100 unit/mL injection     LORazepam 1 MG tablet  Commonly known as: ATIVAN     warfarin 5 MG tablet  Commonly known as: COUMADIN               Where to Get Your Medications        These medications were sent to [facility] [address], [place] [address]      Hours: Mon-Fri, 8a-5:30p Phone: [phone]   ciprofloxacin HCl 500 MG tablet  ENTRESTO  mg per tablet  furosemide 40 MG tablet  metFORMIN 1000 MG tablet  metoprolol succinate 25 MG 24 hr tablet          Thank you  Teresa Abdul, PharmD  [phone]

## 2023-02-10 NOTE — PROGRESS NOTES
Future Appointments   Date Time Provider Department Center   2/22/2023  9:00 AM Poonam Ellis MD [facility] [place]

## 2023-02-10 NOTE — SUBJECTIVE & OBJECTIVE
Interval History: awake and alert,   Diuresing, appreciate cardiology recs switch to p.o. Isis and possible discharge todayadditional 20 mg for weight gain of 2 pounds in 24 hours or 5 pounds in 1 week),    Review of Systems   Constitutional:  Positive for activity change. Negative for appetite change, diaphoresis, fatigue and fever.   HENT:  Negative for congestion.    Eyes:  Negative for photophobia and visual disturbance.   Respiratory:  Negative for cough, chest tightness and shortness of breath.    Cardiovascular:  Positive for leg swelling. Negative for palpitations.   Endocrine: Negative for cold intolerance and heat intolerance.   Genitourinary:  Negative for dysuria.   Musculoskeletal:  Negative for arthralgias and neck stiffness.   Skin:  Negative for color change and wound.   Neurological:  Positive for weakness.   Psychiatric/Behavioral:  Negative for agitation.    Objective:     Vital Signs (Most Recent):  Temp: 97.5 °F (36.4 °C) (02/10/23 0821)  Pulse: 103 (02/10/23 0821)  Resp: 16 (02/10/23 0821)  BP: 120/73 (02/10/23 0821)  SpO2: (!) 94 % (02/10/23 0821)   Vital Signs (24h Range):  Temp:  [97.2 °F (36.2 °C)-99.2 °F (37.3 °C)] 97.5 °F (36.4 °C)  Pulse:  [] 103  Resp:  [16-26] 16  SpO2:  [94 %-99 %] 94 %  BP: (106-134)/(53-85) 120/73     Weight: 135.2 kg (298 lb 1 oz)  Body mass index is 49.6 kg/m².    Intake/Output Summary (Last 24 hours) at 2/10/2023 1046  Last data filed at 2/10/2023 0602  Gross per 24 hour   Intake --   Output 1100 ml   Net -1100 ml      Physical Exam  Constitutional:       Appearance: She is obese. She is not diaphoretic.   HENT:      Head: Normocephalic and atraumatic.   Cardiovascular:      Rate and Rhythm: Normal rate.   Pulmonary:      Effort: Pulmonary effort is normal.   Abdominal:      General: Bowel sounds are normal.      Palpations: Abdomen is soft.      Comments: obese   Musculoskeletal:      Cervical back: Normal range of motion.      Right lower leg: Edema  present.      Left lower leg: Edema present.   Skin:     General: Skin is warm.      Comments: Left fifth digit dressing in place   Neurological:      General: No focal deficit present.      Mental Status: She is alert and oriented to person, place, and time. Mental status is at baseline.   Psychiatric:         Mood and Affect: Mood normal.         Behavior: Behavior normal.       Significant Labs: A1C:   Recent Labs   Lab 02/09/23  1335   HGBA1C 8.1*     ABGs: No results for input(s): PH, PCO2, HCO3, POCSATURATED, BE, TOTALHB, COHB, METHB, O2HB, POCFIO2, PO2 in the last 48 hours.  Blood Culture: No results for input(s): LABBLOO in the last 48 hours.  CBC:   Recent Labs   Lab 02/09/23  0153   WBC 8.19   HGB 11.6*   HCT 37.4        CMP:   Recent Labs   Lab 02/09/23  0153 02/10/23  0309    139   K 4.3 3.8    107   CO2 24 19*   * 187*   BUN 20* 22*   CREATININE 1.30 1.4   CALCIUM 9.1 9.3   PROT 7.8  --    ALBUMIN 4.3  --    BILITOT 0.4  --    ALKPHOS 77  --    AST 31  --    ALT 28  --    ANIONGAP 7* 13     Lactic Acid: No results for input(s): LACTATE in the last 48 hours.  Lipase: No results for input(s): LIPASE in the last 48 hours.  Lipid Panel: No results for input(s): CHOL, HDL, LDLCALC, TRIG, CHOLHDL in the last 48 hours.  Magnesium:   Recent Labs   Lab 02/09/23  1335   MG 1.5*     Troponin:   Recent Labs   Lab 02/09/23  0153   TROPONINI <0.012     TSH: No results for input(s): TSH in the last 4320 hours.  Urine Culture: No results for input(s): LABURIN in the last 48 hours.  Urine Studies: No results for input(s): COLORU, APPEARANCEUA, PHUR, SPECGRAV, PROTEINUA, GLUCUA, KETONESU, BILIRUBINUA, OCCULTUA, NITRITE, UROBILINOGEN, LEUKOCYTESUR, RBCUA, WBCUA, BACTERIA, SQUAMEPITHEL, HYALINECASTS in the last 48 hours.    Invalid input(s): WRIGHTSUR    Significant Imaging: I have reviewed and interpreted all pertinent imaging results/findings within the past 24 hours.

## 2023-02-10 NOTE — ASSESSMENT & PLAN NOTE
LVEF reported ~ 30% - records unavailable   Repeat TTE essentially unchanged   S/p ICD- no discharges reported  ADHF  resolved  Medications recently adjusted as outpatient   Patient cleared for DC with Lasix 40 mg daily (additional 20 mg for weight gain of 2 pounds in 24 hours or 5 pounds in 1 week), Entresto, BB, Aldactone   Follow up with [facility] cardiology   Cleared for DC

## 2023-02-10 NOTE — PHARMACY MED REC
"[facility]           Pharmacy  Admission Medication Reconciliation     Based on information gathered for medication list, you may go to "Admission" then "Reconcile Home Medications" tabs to review and/or act upon those items.     The home medication list has been updated by the Pharmacy department.   Please read ALL comments highlighted in red.   Please address this information as you see fit.    Feel free to contact us if you have any questions or require assistance.    Home medication list has been compared to current inpatient medications. No discrepancies noted. Had discussion with provider regarding patient's med list.    Feel free to contact us if you have any questions or require assistance.    Artem Moise, PharmD  [phone]              .        "

## 2023-02-10 NOTE — PLAN OF CARE
VN note: VN completed AVS and attachments and notified bedside nurse, Harman. Will cont to be available and intervene prn.

## 2023-02-10 NOTE — CONSULTS
"  [facility] - Telemetry  Adult Nutrition  Consult Note    SUMMARY     Recommendations    Recommendation:   1. Encourage intake at meals as tolerated.   2. Encourage diet compliance.   3. Monitor weight/labs.   4. RD to follow to monitor po intake    Goals:  Pt will tolerate diet with at least 50-75% intake at meals by RD follow up  Nutrition Goal Status: new  Communication of RD Recs: reviewed with RN    Assessment and Plan  Severe obesity (BMI >= 40)  Contributing Nutrition Diagnosis  Obesity    Related to (etiology):   Excess energy intake  Physical inactivity    Signs and Symptoms (as evidenced by):   BMI 49    Interventions:  Collaboration with other providers  Sodium, fat, carbohydrate restricted diet    Nutrition Diagnosis Status:   New      Malnutrition Assessment  Subcutaneous Fat Loss (Final Summary): well nourished  Muscle Loss Evaluation (Final Summary): well nourished       Reason for Assessment  Reason For Assessment: consult (wounds)  Diagnosis:  (CHF)  Relevant Medical History: HTN, DM, high cholesterol, stroke, PAD, ROMEL, CHF, PAD, appendectomy  General Information Comments: Pt on 2000 ADA Cardiac 1500ml fluid. Pt with good intake at meals per RN. Devante 20- L toe wounds. Pt asleep on CPAP at visit. NFPE not warranted-pt nourished at this time. No weight hx per chart. Pt was educated by RD yesterday for CHF diet  Nutrition Discharge Planning: pt to d/c on ADA Cardiac diet    Nutrition Risk Screen  Nutrition Risk Screen: no indicators present    Nutrition/Diet History  Food Preferences: no Buddhist or cultural food prefs identified  Factors Affecting Nutritional Intake: None identified at this time    Anthropometrics  Temp: 97.5 °F (36.4 °C)  Height Method: Stated  Height: 5' 5" (165.1 cm)  Height (inches): 65 in  Weight Method: Bed Scale  Weight: 135.2 kg (298 lb 1 oz)  Weight (lb): 298.06 lb  Ideal Body Weight (IBW), Female: 125 lb  % Ideal Body Weight, Female (lb): 238.45 %  BMI (Calculated): " 49.6  BMI Grade: greater than 40 - morbid obesity     Lab/Procedures/Meds  Pertinent Labs Reviewed: reviewed  Pertinent Labs Comments: BUN 22H, Glu 187H  Pertinent Medications Reviewed: reviewed  Pertinent Medications Comments: cipro, Lasix, insulin, coumadin    Estimated/Assessed Needs  Weight Used For Calorie Calculations: 135.2 kg (298 lb 1 oz)  Energy Calorie Requirements (kcal): 1957  Energy Need Method: Eleele-St Jeor  Protein Requirements: 81g (0.6g/kg)  Weight Used For Protein Calculations: 135.2 kg (298 lb 1 oz)  Estimated Fluid Requirement Method: RDA Method  RDA Method (mL): 1957  CHO Requirement: 200g    Nutrition Prescription Ordered  Current Diet Order: 2000 ADA Cardiac    Evaluation of Received Nutrient/Fluid Intake  I/O: 0/1100  Energy Calories Required: meeting needs  Protein Required: meeting needs  Fluid Required: meeting needs  Comments: LBM 2/8  % Intake of Estimated Energy Needs: 75 - 100 %  % Meal Intake: 75 - 100 %    Nutrition Risk  Level of Risk/Frequency of Follow-up:  (2xweekly)     Monitor and Evaluation  Food and Nutrient Intake: food and beverage intake  Food and Nutrient Adminstration: diet order  Physical Activity and Function: nutrition-related ADLs and IADLs  Anthropometric Measurements: weight  Biochemical Data, Medical Tests and Procedures: electrolyte and renal panel  Nutrition-Focused Physical Findings: overall appearance     Nutrition Follow-Up  RD Follow-up?: Yes

## 2023-02-11 NOTE — NURSING
Discharge orders noted. IV and tele box removed. AVS printed and given to patient. VN reviewed AVS with patient. Patient left via wheelchair with family. No distress noted.

## 2023-02-17 ENCOUNTER — PATIENT OUTREACH (OUTPATIENT)
Dept: ADMINISTRATIVE | Facility: CLINIC | Age: 53
End: 2023-02-17
Payer: COMMERCIAL

## 2023-02-17 NOTE — PROGRESS NOTES
C3 nurse attempted to contact [facility]  for a TCC post hospital discharge follow up call. No answer. Left voicemail with callback information. The patient has a scheduled HOSFU appointment with DORY Martinez on 02/22/2023 @ [id number].

## 2023-02-17 NOTE — PROGRESS NOTES
C3 nurse attempted to contact [facility]  for a TCC post hospital discharge follow up call. No answer. Left voicemail with callback information. The patient has a scheduled HOSFU appointment with DORY Martinez on 02/22/2023 @ 1193.

## 2023-02-21 NOTE — PROGRESS NOTES
C3 nurse attempted to contact [facility] One[facility]  for a TCC post hospital discharge follow up call. No answer. Left voicemail with callback information. The patient has a scheduled HOSFU appointment with DORY Martinez on 02/22/2023 @ 9512.

## 2025-04-09 ENCOUNTER — HOSPITAL ENCOUNTER (EMERGENCY)
Facility: HOSPITAL | Age: 55
Discharge: HOME | End: 2025-04-09
Attending: STUDENT IN AN ORGANIZED HEALTH CARE EDUCATION/TRAINING PROGRAM
Payer: COMMERCIAL

## 2025-04-09 VITALS
HEART RATE: 102 BPM | SYSTOLIC BLOOD PRESSURE: 129 MMHG | RESPIRATION RATE: 20 BRPM | BODY MASS INDEX: 46.98 KG/M2 | DIASTOLIC BLOOD PRESSURE: 79 MMHG | WEIGHT: 282 LBS | HEIGHT: 65 IN | OXYGEN SATURATION: 98 % | TEMPERATURE: 98 F

## 2025-04-09 DIAGNOSIS — B37.41 YEAST CYSTITIS: ICD-10-CM

## 2025-04-09 DIAGNOSIS — N30.00 ACUTE CYSTITIS WITHOUT HEMATURIA: Primary | ICD-10-CM

## 2025-04-09 LAB
BACTERIA #/AREA URNS HPF: ABNORMAL /HPF
BILIRUB UR QL STRIP.AUTO: NEGATIVE
CLARITY UR: CLEAR
COLOR UR AUTO: YELLOW
GLUCOSE UR QL STRIP: ABNORMAL
HGB UR QL STRIP: ABNORMAL
KETONES UR QL STRIP: ABNORMAL
LEUKOCYTE ESTERASE UR QL STRIP: NEGATIVE
MICROSCOPIC COMMENT: ABNORMAL
NITRITE UR QL STRIP: NEGATIVE
PH UR STRIP: 6 [PH]
PROT UR QL STRIP: NEGATIVE
RBC #/AREA URNS HPF: 4 /HPF (ref 0–4)
SP GR UR STRIP: 1.02
UROBILINOGEN UR STRIP-ACNC: NEGATIVE EU/DL
WBC #/AREA URNS HPF: 3 /HPF (ref 0–5)
YEAST URNS QL MICRO: ABNORMAL /HPF

## 2025-04-09 PROCEDURE — 81003 URINALYSIS AUTO W/O SCOPE: CPT | Mod: ER | Performed by: STUDENT IN AN ORGANIZED HEALTH CARE EDUCATION/TRAINING PROGRAM

## 2025-04-09 PROCEDURE — 99284 EMERGENCY DEPT VISIT MOD MDM: CPT | Mod: ER

## 2025-04-09 RX ORDER — FLUCONAZOLE 150 MG/1
150 TABLET ORAL DAILY
Qty: 2 TABLET | Refills: 0 | Status: SHIPPED | OUTPATIENT
Start: 2025-04-09 | End: 2025-04-11

## 2025-04-09 RX ORDER — CEPHALEXIN 500 MG/1
500 CAPSULE ORAL 4 TIMES DAILY
Qty: 20 CAPSULE | Refills: 0 | Status: SHIPPED | OUTPATIENT
Start: 2025-04-09 | End: 2025-04-14

## 2025-04-09 NOTE — ED PROVIDER NOTES
Encounter Date: 2025       History     Chief Complaint   Patient presents with    Fever     Reports fever, chills, and increased urinary frequency since yesterday.     she presents to the ED due to feeling of urinary urgency since yesterday evening.  States she is barely making it to the toilet due to this.  Denies dysuria.  However has had general malaise and slept most of the day yesterday.  Notes she had fever yesterday, can not recall what it measured at.  Has not taken any medication today.  Nausea or vomiting.  No abdominal pain.  No cough or congestion.          Makes additional mention of feeling of palpitations over the weekend which have since resolved.  Additionally notes that 2 days ago she had bright red blood with wiping after going to the bathroom and believes it was coming from the rectum.  Was painless.  That did not occur with additional bowel movements.  States she plans to establish with gynecology for close follow-up.         The history is provided by the patient.     Review of patient's allergies indicates:   Allergen Reactions    Codeine      Past Medical History:   Diagnosis Date    Blood clot associated with vein wall inflammation     CHF (congestive heart failure)     Diabetes mellitus     High cholesterol     Hypertension     Mixed hyperlipidemia     NICM (nonischemic cardiomyopathy)     ROMEL (obstructive sleep apnea)     PAD (peripheral artery disease)     PAD (peripheral artery disease)     Stroke      Past Surgical History:   Procedure Laterality Date    APPENDECTOMY      CARDIAC CATHETERIZATION      CARDIAC DEFIBRILLATOR PLACEMENT       SECTION       No family history on file.  Social History[1]  Review of Systems    Physical Exam     Initial Vitals [25 0919]   BP Pulse Resp Temp SpO2   129/79 102 20 98 °F (36.7 °C) 98 %      MAP       --         Physical Exam    Nursing note and vitals reviewed.  Constitutional: She appears well-developed and well-nourished.   HENT:    Head: Normocephalic and atraumatic.   Eyes: EOM are normal. Pupils are equal, round, and reactive to light.   Neck: Neck supple.   Normal range of motion.  Cardiovascular:  Normal rate, regular rhythm and normal heart sounds.           Pulmonary/Chest: Breath sounds normal. No respiratory distress.   Abdominal: Abdomen is soft. She exhibits no distension. There is no abdominal tenderness.   No CVATB There is no rebound and no guarding.   Musculoskeletal:         General: Normal range of motion.      Cervical back: Normal range of motion and neck supple.     Neurological: She is alert and oriented to person, place, and time.   Skin: Skin is warm and dry.   Psychiatric: She has a normal mood and affect.         ED Course   Procedures  Labs Reviewed   URINALYSIS, REFLEX TO URINE CULTURE - Abnormal       Result Value    Color, UA Yellow      Appearance, UA Clear      pH, UA 6.0      Spec Grav UA 1.020      Protein, UA Negative      Glucose, UA 2+ (*)     Ketones, UA 1+ (*)     Bilirubin, UA Negative      Blood, UA 1+ (*)     Nitrites, UA Negative      Urobilinogen, UA Negative      Leukocyte Esterase, UA Negative     URINALYSIS MICROSCOPIC - Abnormal    RBC, UA 4      WBC, UA 3      Bacteria, UA Occasional      Yeast, UA Few (*)     Microscopic Comment              Imaging Results    None          Medications - No data to display  Medical Decision Making  Amount and/or Complexity of Data Reviewed  External Data Reviewed: labs and notes.     Details: On coumadin with frequent outpt INR checks    Cardio visit 3/17-maintenance visit.   Labs: ordered. Decision-making details documented in ED Course.    Risk  Prescription drug management.               ED Course as of 04/09/25 1046   Wed Apr 09, 2025   1009 Bacteria, UA: Occasional [HL]   1010 Yeast, UA(!): Few [HL]   1010 Blood, UA(!): 1+ [HL]   1022 Bacteria, UA: Occasional [HL]      ED Course User Index  [HL] Giulia Brand, DO               Medical Decision Making:    Differential Diagnosis:   UTI, hyperglycemia, viral illness, kidney stone         No acute emergent medical condition has been identified. The patient appears to be low risk for an emergent medical condition is appropriate for discharge with outpatient f/u as detailed in discharge instructions for reevaluation and possible continued outpatient workup and management. I have discussed the workup with the patient, who has verbalized understanding of the plan and need for outpatient follow-up.  This evaluation does not preclude the development of an emergent condition so in addition, I have advised the patient that they can return to the ED at any time with worsening or change of their symptoms, or with any other medical complaint.       Clinical Impression:  Final diagnoses:  [N30.00] Acute cystitis without hematuria (Primary)  [B37.41] Yeast cystitis          ED Disposition Condition    Discharge Stable          ED Prescriptions       Medication Sig Dispense Start Date End Date Auth. Provider    cephALEXin (KEFLEX) 500 MG capsule Take 1 capsule (500 mg total) by mouth 4 (four) times daily. for 5 days 20 capsule 4/9/2025 4/14/2025 Giulia Brand DO    fluconazole (DIFLUCAN) 150 MG Tab Take 1 tablet (150 mg total) by mouth once daily. Take second tablet 72 hours later if still having symptoms. for 2 doses 2 tablet 4/9/2025 4/11/2025 Giulia Brand DO          Follow-up Information       Follow up With Specialties Details Why Contact Info    Cisco Marquez MD Family Medicine Schedule an appointment as soon as possible for a visit in 2 days  [address] [facility] [facility]  [address]  [facility]  [place] [address]  [phone]      [facility] - Emergency Dept Emergency Medicine  As needed, If symptoms worsen [address] [facility]  Emergency Department  [facility] [address] [phone]               [1]   Social History  Tobacco Use    Smoking status: Never    Smokeless tobacco: Never   Substance Use  Topics    Alcohol use: No    Drug use: No        Giulia Brand DO  04/09/25 1050

## 2025-04-09 NOTE — ED PROVIDER NOTES
NAME:  Gama Oneil  CSN:     [id number]  MRN:    [id number]  ADMIT DATE: 4/9/2025        eMERGENCY dEPARTMENT eNCOUnter    CHIEF COMPLAINT    Chief Complaint   Patient presents with    Fever     Reports fever, chills, and increased urinary frequency since yesterday.       HPI    Gama Oneil is a 54 y.o. female with a past medical history of  has a past medical history of Blood clot associated with vein wall inflammation, CHF (congestive heart failure), Diabetes mellitus, High cholesterol, Hypertension, Mixed hyperlipidemia, NICM (nonischemic cardiomyopathy), ROMEL (obstructive sleep apnea), PAD (peripheral artery disease), PAD (peripheral artery disease), and Stroke.     she presents to the ED due to feeling of urinary urgency since yesterday evening.  States she is barely making it to the toilet due to this.  Denies dysuria.  However has had general malaise and slept most of the day yesterday.  Notes she had fever yesterday, can not recall what it measured at.  Has not taken any medication today.  Nausea or vomiting.  No abdominal pain.  No cough or congestion.        Makes additional mention of feeling of palpitations over the weekend which have since resolved.  Additionally notes that 2 days ago she had bright red blood with wiping after going to the bathroom and believes it was coming from the rectum.  Was painless.  That did not occur with additional bowel movements.  States she plans to establish with gynecology for close follow-up.    HPI       PAST MEDICAL HISTORY  Past Medical History:   Diagnosis Date    Blood clot associated with vein wall inflammation     CHF (congestive heart failure)     Diabetes mellitus     High cholesterol     Hypertension     Mixed hyperlipidemia     NICM (nonischemic cardiomyopathy)     ROMEL (obstructive sleep apnea)     PAD (peripheral artery disease)     PAD (peripheral artery disease)     Stroke        SURGICAL HISTORY    Past Surgical History:   Procedure Laterality  Date    APPENDECTOMY      CARDIAC CATHETERIZATION      CARDIAC DEFIBRILLATOR PLACEMENT       SECTION         FAMILY HISTORY    No family history on file.    SOCIAL HISTORY    Social History     Socioeconomic History    Marital status:    Tobacco Use    Smoking status: Never    Smokeless tobacco: Never   Substance and Sexual Activity    Alcohol use: No    Drug use: No     Social Drivers of Health     Financial Resource Strain: Low Risk  (2024)    Received from [facility]    Overall Financial Resource Strain (CARDIA)     Difficulty of Paying Living Expenses: Not hard at all   Food Insecurity: No Food Insecurity (2024)    Received from [facility]    Hunger Vital Sign     Worried About Running Out of Food in the Last Year: Never true     Ran Out of Food in the Last Year: Never true   Transportation Needs: No Transportation Needs (2024)    Received from [facility]    PRAPARE - Transportation     Lack of Transportation (Medical): No     Lack of Transportation (Non-Medical): No   Physical Activity: Inactive (2024)    Received from [facility]    Exercise Vital Sign     Days of Exercise per Week: 0 days     Minutes of Exercise per Session: 0 min   Stress: No Stress Concern Present (2024)    Received from [facility]    [employer] - Occupational Stress Questionnaire     Feeling of Stress : Only a little   Housing Stability: Low Risk  (2024)    Received from [facility]    Housing Stability Vital Sign     Unable to Pay for Housing in the Last Year: No     Number of Places Lived in the Last Year: 1     Unstable Housing in the Last Year: No       MEDICATIONS  Current Outpatient Medications   Medication Instructions    cephALEXin (KEFLEX) 500 mg, Oral, 4 times daily    DULoxetine (CYMBALTA) 60 mg, Oral, 2 times daily    fluconazole (DIFLUCAN) 150 mg, Oral, Daily, Take second tablet 72 hours later if still having symptoms.     "furosemide (LASIX) 40 MG tablet Take 1 tablet (40 mg total) by mouth once daily. ( take additional 20 mg for weight gain of 2 pounds in 24 hours or 5 pounds in 1 week),    gabapentin (NEURONTIN) 100 mg, Oral, 2 times daily    insulin glargine (LANTUS) 100 unit/mL injection Subcutaneous, Nightly, 40 units in AM, 50 units in PM    lactulose (CHRONULAC) 10 g, Oral, Every 4 hours PRN    metFORMIN (GLUCOPHAGE) 1,000 mg, Oral, 2 times daily with meals    metoprolol succinate (TOPROL-XL) 25 mg, Oral, Daily    mometasone (NASONEX) 50 mcg/actuation nasal spray 2 sprays, Nasal, Daily    OZEMPIC 1 mg, Subcutaneous, Every Friday    rosuvastatin (CRESTOR) 40 mg, Oral, Daily    sacubitriL-valsartan (ENTRESTO)  mg per tablet 1 tablet, Oral, 2 times daily    spironolactone (ALDACTONE) 25 MG tablet 1 tablet, Oral, Daily    warfarin (COUMADIN) 5 mg, Oral, Daily       ALLERGIES    Review of patient's allergies indicates:   Allergen Reactions    Codeine          REVIEW OF SYSTEMS   Review of Systems       PHYSICAL EXAM    Reviewed Triage Note    VITAL SIGNS:   ED Triage Vitals [04/09/25 0919]   Encounter Vitals Group      /79      Systolic BP Percentile       Diastolic BP Percentile       Pulse 102      Resp 20      Temp 98 °F (36.7 °C)      Temp Source Oral      SpO2 98 %      Weight 282 lb      Height 5' 5"      Head Circumference       Peak Flow       Pain Score       Pain Loc       Pain Education       Exclude from Growth Chart        Patient Vitals for the past 24 hrs:   BP Temp Temp src Pulse Resp SpO2 Height Weight   04/09/25 0919 129/79 98 °F (36.7 °C) Oral 102 20 98 % 5' 5" (1.651 m) 127.9 kg (282 lb)       Physical Exam    Nursing note and vitals reviewed.  Constitutional: She appears well-developed and well-nourished.   HENT:   Head: Normocephalic and atraumatic.   Eyes: EOM are normal. Pupils are equal, round, and reactive to light.   Neck: Neck supple.   Normal range of motion.  Cardiovascular:  " Normal rate, regular rhythm and normal heart sounds.           Pulmonary/Chest: Breath sounds normal. No respiratory distress. She has no wheezes. She has no rhonchi. She has no rales.   Abdominal: Abdomen is soft. There is no abdominal tenderness.   No CVA tenderness bilaterally   Musculoskeletal:         General: Normal range of motion.      Cervical back: Normal range of motion and neck supple.     Neurological: She is alert and oriented to person, place, and time.   Skin: Skin is warm and dry.   Psychiatric: She has a normal mood and affect.          EKG     Interpreted by EM physician if performed:               LABS  Pertinent labs reviewed. (See chart for details)   Labs Reviewed   URINALYSIS, REFLEX TO URINE CULTURE - Abnormal       Result Value    Color, UA Yellow      Appearance, UA Clear      pH, UA 6.0      Spec Grav UA 1.020      Protein, UA Negative      Glucose, UA 2+ (*)     Ketones, UA 1+ (*)     Bilirubin, UA Negative      Blood, UA 1+ (*)     Nitrites, UA Negative      Urobilinogen, UA Negative      Leukocyte Esterase, UA Negative     URINALYSIS MICROSCOPIC - Abnormal    RBC, UA 4      WBC, UA 3      Bacteria, UA Occasional      Yeast, UA Few (*)     Microscopic Comment             RADIOLOGY          Imaging Results    None           PROCEDURES    Procedures      ED COURSE & MEDICAL DECISION MAKING    Pertinent Labs & Imaging studies reviewed. (See chart for details and specific orders.)          Summary of review of records:       Medical Decision Making  Amount and/or Complexity of Data Reviewed  Labs:  Decision-making details documented in ED Course.    Risk  Prescription drug management.      Gama Oneil is a 54 y.o. female ***    Differential includes but is not limited to ***            Medications - No data to display    ED Course as of 04/09/25 1024   Wed Apr 09, 2025   1009 Bacteria, UA: Occasional [HL]   1010 Yeast, UA(!): Few [HL]   1010 Blood, UA(!): 1+ [HL]   1022 Bacteria, UA:  Occasional [HL]      ED Course User Index  [HL] Giulia Brand DO             FINAL IMPRESSION    Final diagnoses:  [N30.00] Acute cystitis without hematuria (Primary)  [B37.41] Yeast cystitis       DISPOSITION  Patient *** in stable condition        ED Prescriptions       Medication Sig Dispense Start Date End Date Auth. Provider    cephALEXin (KEFLEX) 500 MG capsule Take 1 capsule (500 mg total) by mouth 4 (four) times daily. for 5 days 20 capsule 4/9/2025 4/14/2025 Giulia Brand DO    fluconazole (DIFLUCAN) 150 MG Tab Take 1 tablet (150 mg total) by mouth once daily. Take second tablet 72 hours later if still having symptoms. for 2 doses 2 tablet 4/9/2025 4/11/2025 Giulia Brand DO          Follow-up Information       Follow up With Specialties Details Why Contact Info    Cisco Marquez MD Family Medicine Schedule an appointment as soon as possible for a visit in 2 days  [address] [facility] [facility]  [address]  [facility]  [place] [address]  [phone]      [facility] - Emergency Dept Emergency Medicine  As needed, If symptoms worsen [address] [facility]  Emergency Department  [facility] [address] [phone]              DISCLAIMER: This note was prepared with M*modal voice recognition transcription software. Garbled syntax, mangled pronouns, and other bizarre constructions may be attributed to that software system.

## 2025-08-28 ENCOUNTER — LAB VISIT (OUTPATIENT)
Dept: LAB | Facility: HOSPITAL | Age: 55
End: 2025-08-28
Attending: INTERNAL MEDICINE
Payer: COMMERCIAL

## 2025-08-28 DIAGNOSIS — N18.30 STAGE 3 CHRONIC KIDNEY DISEASE, UNSPECIFIED WHETHER STAGE 3A OR 3B CKD: ICD-10-CM

## 2025-08-28 DIAGNOSIS — N18.30 DIABETES MELLITUS DUE TO UNDERLYING CONDITION WITH STAGE 3 CHRONIC KIDNEY DISEASE, WITH LONG-TERM CURRENT USE OF INSULIN, UNSPECIFIED WHETHER STAGE 3A OR 3B CKD: ICD-10-CM

## 2025-08-28 DIAGNOSIS — E08.22 DIABETES MELLITUS DUE TO UNDERLYING CONDITION WITH STAGE 3 CHRONIC KIDNEY DISEASE, WITH LONG-TERM CURRENT USE OF INSULIN, UNSPECIFIED WHETHER STAGE 3A OR 3B CKD: ICD-10-CM

## 2025-08-28 DIAGNOSIS — M89.8X9 METABOLIC BONE DISEASE: ICD-10-CM

## 2025-08-28 DIAGNOSIS — N18.30 ANEMIA IN STAGE 3 CHRONIC KIDNEY DISEASE, UNSPECIFIED WHETHER STAGE 3A OR 3B CKD: ICD-10-CM

## 2025-08-28 DIAGNOSIS — D63.1 ANEMIA IN STAGE 3 CHRONIC KIDNEY DISEASE, UNSPECIFIED WHETHER STAGE 3A OR 3B CKD: ICD-10-CM

## 2025-08-28 DIAGNOSIS — Z79.4 DIABETES MELLITUS DUE TO UNDERLYING CONDITION WITH STAGE 3 CHRONIC KIDNEY DISEASE, WITH LONG-TERM CURRENT USE OF INSULIN, UNSPECIFIED WHETHER STAGE 3A OR 3B CKD: ICD-10-CM

## 2025-08-28 DIAGNOSIS — E53.8 DEFICIENCY OF OTHER SPECIFIED B GROUP VITAMINS: ICD-10-CM

## 2025-08-28 LAB
IRON SATN MFR SERPL: 12 % (ref 20–50)
IRON SERPL-MCNC: 45 UG/DL (ref 30–160)
TIBC SERPL-MCNC: 382 UG/DL (ref 250–450)
TRANSFERRIN SERPL-MCNC: 258 MG/DL (ref 200–375)
URATE SERPL-MCNC: 8.9 MG/DL (ref 2.4–5.7)

## 2025-08-28 PROCEDURE — 83921 ORGANIC ACID SINGLE QUANT: CPT

## 2025-08-28 PROCEDURE — 82607 VITAMIN B-12: CPT

## 2025-08-28 PROCEDURE — 83540 ASSAY OF IRON: CPT

## 2025-08-28 PROCEDURE — 84550 ASSAY OF BLOOD/URIC ACID: CPT

## 2025-08-28 PROCEDURE — 36415 COLL VENOUS BLD VENIPUNCTURE: CPT

## 2025-08-30 LAB — VIT B12 SERPL-MCNC: 349 NG/L (ref 180–914)

## 2025-08-31 ENCOUNTER — OFFICE VISIT (OUTPATIENT)
Dept: URGENT CARE | Facility: CLINIC | Age: 55
End: 2025-08-31
Payer: COMMERCIAL

## 2025-08-31 VITALS
HEIGHT: 65 IN | SYSTOLIC BLOOD PRESSURE: 144 MMHG | WEIGHT: 292.13 LBS | DIASTOLIC BLOOD PRESSURE: 85 MMHG | OXYGEN SATURATION: 95 % | RESPIRATION RATE: 17 BRPM | BODY MASS INDEX: 48.67 KG/M2 | TEMPERATURE: 100 F | HEART RATE: 96 BPM

## 2025-08-31 DIAGNOSIS — J02.9 SORE THROAT: ICD-10-CM

## 2025-08-31 DIAGNOSIS — R09.81 NASAL CONGESTION WITH RHINORRHEA: ICD-10-CM

## 2025-08-31 DIAGNOSIS — I10 ELEVATED BLOOD PRESSURE READING IN OFFICE WITH DIAGNOSIS OF HYPERTENSION: ICD-10-CM

## 2025-08-31 DIAGNOSIS — J34.89 NASAL CONGESTION WITH RHINORRHEA: ICD-10-CM

## 2025-08-31 DIAGNOSIS — J18.9 PNEUMONIA OF RIGHT LOWER LOBE DUE TO INFECTIOUS ORGANISM: Primary | ICD-10-CM

## 2025-08-31 DIAGNOSIS — R06.02 SHORTNESS OF BREATH: ICD-10-CM

## 2025-08-31 DIAGNOSIS — R05.8 OTHER COUGH: ICD-10-CM

## 2025-08-31 LAB
CTP QC/QA: YES
CTP QC/QA: YES
MOLECULAR STREP A: NEGATIVE
SARS-COV+SARS-COV-2 AG RESP QL IA.RAPID: NEGATIVE

## 2025-08-31 PROCEDURE — 96372 THER/PROPH/DIAG INJ SC/IM: CPT | Mod: 59,S$GLB,, | Performed by: PHYSICIAN ASSISTANT

## 2025-08-31 PROCEDURE — 71046 X-RAY EXAM CHEST 2 VIEWS: CPT | Mod: S$GLB,,, | Performed by: RADIOLOGY

## 2025-08-31 PROCEDURE — 94640 AIRWAY INHALATION TREATMENT: CPT | Mod: S$GLB,,, | Performed by: PHYSICIAN ASSISTANT

## 2025-08-31 PROCEDURE — 87811 SARS-COV-2 COVID19 W/OPTIC: CPT | Mod: QW,S$GLB,, | Performed by: PHYSICIAN ASSISTANT

## 2025-08-31 PROCEDURE — 87651 STREP A DNA AMP PROBE: CPT | Mod: QW,S$GLB,, | Performed by: PHYSICIAN ASSISTANT

## 2025-08-31 PROCEDURE — 99214 OFFICE O/P EST MOD 30 MIN: CPT | Mod: 25,S$GLB,, | Performed by: PHYSICIAN ASSISTANT

## 2025-08-31 RX ORDER — AMOXICILLIN AND CLAVULANATE POTASSIUM 875; 125 MG/1; MG/1
1 TABLET, FILM COATED ORAL 2 TIMES DAILY
Qty: 14 TABLET | Refills: 0 | Status: SHIPPED | OUTPATIENT
Start: 2025-08-31 | End: 2025-09-07

## 2025-08-31 RX ORDER — LIDOCAINE HYDROCHLORIDE 10 MG/ML
2.1 INJECTION, SOLUTION INFILTRATION; PERINEURAL
Status: COMPLETED | OUTPATIENT
Start: 2025-08-31 | End: 2025-08-31

## 2025-08-31 RX ORDER — LEVALBUTEROL TARTRATE 45 UG/1
1-2 AEROSOL, METERED ORAL EVERY 4 HOURS PRN
Qty: 45 G | Refills: 0 | Status: SHIPPED | OUTPATIENT
Start: 2025-08-31 | End: 2025-09-30

## 2025-08-31 RX ORDER — CEFTRIAXONE 1 G/1
1 INJECTION, POWDER, FOR SOLUTION INTRAMUSCULAR; INTRAVENOUS ONCE
Status: COMPLETED | OUTPATIENT
Start: 2025-08-31 | End: 2025-08-31

## 2025-08-31 RX ORDER — FLUTICASONE PROPIONATE 50 MCG
2 SPRAY, SUSPENSION (ML) NASAL DAILY PRN
Qty: 15.8 ML | Refills: 0 | Status: SHIPPED | OUTPATIENT
Start: 2025-08-31 | End: 2025-09-30

## 2025-08-31 RX ORDER — IPRATROPIUM BROMIDE 0.5 MG/2.5ML
0.5 SOLUTION RESPIRATORY (INHALATION)
Status: COMPLETED | OUTPATIENT
Start: 2025-08-31 | End: 2025-08-31

## 2025-08-31 RX ORDER — AZITHROMYCIN 250 MG/1
TABLET, FILM COATED ORAL
Qty: 6 TABLET | Refills: 0 | Status: ON HOLD | OUTPATIENT
Start: 2025-08-31 | End: 2025-09-06 | Stop reason: HOSPADM

## 2025-08-31 RX ORDER — DEXAMETHASONE SODIUM PHOSPHATE 10 MG/ML
10 INJECTION INTRAMUSCULAR; INTRAVENOUS ONCE
Status: COMPLETED | OUTPATIENT
Start: 2025-08-31 | End: 2025-08-31

## 2025-08-31 RX ORDER — LIDOCAINE HYDROCHLORIDE 20 MG/ML
SOLUTION OROPHARYNGEAL
Qty: 100 ML | Refills: 0 | Status: SHIPPED | OUTPATIENT
Start: 2025-08-31

## 2025-08-31 RX ORDER — LORATADINE 10 MG/1
10 TABLET ORAL DAILY PRN
Qty: 30 TABLET | Refills: 0 | Status: SHIPPED | OUTPATIENT
Start: 2025-08-31 | End: 2025-09-30

## 2025-08-31 RX ORDER — ALBUTEROL SULFATE 0.83 MG/ML
2.5 SOLUTION RESPIRATORY (INHALATION)
Status: COMPLETED | OUTPATIENT
Start: 2025-08-31 | End: 2025-08-31

## 2025-08-31 RX ORDER — BENZONATATE 100 MG/1
100 CAPSULE ORAL 3 TIMES DAILY PRN
Qty: 30 CAPSULE | Refills: 0 | Status: SHIPPED | OUTPATIENT
Start: 2025-08-31 | End: 2025-09-10

## 2025-08-31 RX ADMIN — CEFTRIAXONE 1 G: 1 INJECTION, POWDER, FOR SOLUTION INTRAMUSCULAR; INTRAVENOUS at 02:08

## 2025-08-31 RX ADMIN — IPRATROPIUM BROMIDE 0.5 MG: 0.5 SOLUTION RESPIRATORY (INHALATION) at 02:08

## 2025-08-31 RX ADMIN — DEXAMETHASONE SODIUM PHOSPHATE 10 MG: 10 INJECTION INTRAMUSCULAR; INTRAVENOUS at 02:08

## 2025-08-31 RX ADMIN — LIDOCAINE HYDROCHLORIDE 2.1 ML: 10 INJECTION, SOLUTION INFILTRATION; PERINEURAL at 02:08

## 2025-08-31 RX ADMIN — ALBUTEROL SULFATE 2.5 MG: 0.83 SOLUTION RESPIRATORY (INHALATION) at 02:08

## 2025-09-02 PROBLEM — Z13.6 SCREENING FOR CARDIOVASCULAR CONDITION: Status: ACTIVE | Noted: 2025-09-02

## 2025-09-03 PROBLEM — I50.23 ACUTE ON CHRONIC SYSTOLIC CONGESTIVE HEART FAILURE: Status: ACTIVE | Noted: 2025-09-03

## 2025-09-03 PROBLEM — I10 ESSENTIAL HYPERTENSION: Status: ACTIVE | Noted: 2018-05-16

## 2025-09-03 PROBLEM — I50.20 HFREF (HEART FAILURE WITH REDUCED EJECTION FRACTION): Status: ACTIVE | Noted: 2025-09-03

## 2025-09-03 PROBLEM — I50.22 CHRONIC SYSTOLIC HEART FAILURE: Status: ACTIVE | Noted: 2024-05-13

## 2025-09-03 PROBLEM — E78.2 MIXED HYPERLIPIDEMIA: Status: ACTIVE | Noted: 2022-04-19

## 2025-09-03 PROBLEM — J96.01 ACUTE HYPOXIC RESPIRATORY FAILURE: Status: ACTIVE | Noted: 2025-09-03

## 2025-09-03 PROBLEM — A41.9 SEPSIS: Status: ACTIVE | Noted: 2025-09-03

## 2025-09-03 LAB — METHYLMALONATE SERPL-SCNC: 0.19 NMOL/ML

## 2025-09-04 PROBLEM — J18.9 CAP (COMMUNITY ACQUIRED PNEUMONIA): Status: ACTIVE | Noted: 2025-09-04

## 2025-09-04 PROBLEM — I50.33 ACUTE ON CHRONIC HEART FAILURE WITH PRESERVED EJECTION FRACTION: Status: ACTIVE | Noted: 2025-09-03
